# Patient Record
Sex: MALE | Race: BLACK OR AFRICAN AMERICAN | NOT HISPANIC OR LATINO | Employment: FULL TIME | URBAN - METROPOLITAN AREA
[De-identification: names, ages, dates, MRNs, and addresses within clinical notes are randomized per-mention and may not be internally consistent; named-entity substitution may affect disease eponyms.]

---

## 2017-01-12 ENCOUNTER — HISTORICAL (OUTPATIENT)
Dept: LAB | Facility: HOSPITAL | Age: 32
End: 2017-01-12

## 2017-01-13 ENCOUNTER — HISTORICAL (OUTPATIENT)
Dept: ANESTHESIOLOGY | Facility: HOSPITAL | Age: 32
End: 2017-01-13

## 2017-08-21 ENCOUNTER — HISTORICAL (OUTPATIENT)
Dept: LAB | Facility: HOSPITAL | Age: 32
End: 2017-08-21

## 2017-11-28 LAB
CHOLEST SERPL-MCNC: 181 MG/DL (ref 100–199)
HDLC SERPL-MCNC: 46 MG/DL
LDLC SERPL CALC-MCNC: 118 MG/DL (ref 0–99)
TRIGL SERPL-MCNC: 87 MG/DL (ref 0–149)
VLDLC SERPL CALC-MCNC: 17 MG/DL (ref 5–40)

## 2017-12-16 LAB
INFLUENZA A ANTIGEN, POC: POSITIVE
INFLUENZA B ANTIGEN, POC: NEGATIVE

## 2018-02-14 ENCOUNTER — HISTORICAL (OUTPATIENT)
Dept: LAB | Facility: HOSPITAL | Age: 33
End: 2018-02-14

## 2018-02-14 LAB — B-HCG FREE SERPL-ACNC: 1 MIU/ML

## 2018-02-23 ENCOUNTER — HISTORICAL (OUTPATIENT)
Dept: ANESTHESIOLOGY | Facility: HOSPITAL | Age: 33
End: 2018-02-23

## 2018-09-07 ENCOUNTER — HISTORICAL (OUTPATIENT)
Dept: LAB | Facility: HOSPITAL | Age: 33
End: 2018-09-07

## 2018-09-07 LAB — B-HCG FREE SERPL-ACNC: <1 MIU/ML

## 2018-09-17 ENCOUNTER — HISTORICAL (OUTPATIENT)
Dept: ANESTHESIOLOGY | Facility: HOSPITAL | Age: 33
End: 2018-09-17

## 2019-01-31 ENCOUNTER — HISTORICAL (OUTPATIENT)
Dept: LAB | Facility: HOSPITAL | Age: 34
End: 2019-01-31

## 2019-01-31 LAB
ALBUMIN SERPL-MCNC: 4 GM/DL (ref 3.4–5)
ALBUMIN/GLOB SERPL: 1.1 RATIO (ref 1.1–2)
ALP SERPL-CCNC: 123 UNIT/L (ref 46–116)
ALT SERPL-CCNC: 21 UNIT/L (ref 12–78)
APPEARANCE, UA: CLEAR
AST SERPL-CCNC: 17 UNIT/L (ref 15–37)
BACTERIA SPEC CULT: NORMAL
BILIRUB SERPL-MCNC: 0.3 MG/DL (ref 0.2–1)
BILIRUB UR QL STRIP: NEGATIVE
BILIRUBIN DIRECT+TOT PNL SERPL-MCNC: 0.07 MG/DL (ref 0–0.2)
BILIRUBIN DIRECT+TOT PNL SERPL-MCNC: 0.23 MG/DL (ref 0–0.8)
BUN SERPL-MCNC: 16.1 MG/DL (ref 7–18)
CALCIUM SERPL-MCNC: 9.2 MG/DL (ref 8.5–10.1)
CHLORIDE SERPL-SCNC: 99 MMOL/L (ref 98–107)
CHOLEST SERPL-MCNC: 202 MG/DL (ref 0–200)
CHOLEST/HDLC SERPL: 4.6 {RATIO} (ref 0–5)
CO2 SERPL-SCNC: 28.1 MMOL/L (ref 21–32)
COLOR UR: YELLOW
CREAT SERPL-MCNC: 1.37 MG/DL (ref 0.6–1.3)
GLOBULIN SER-MCNC: 3.8 GM/DL (ref 2.4–3.5)
GLUCOSE (UA): NEGATIVE
GLUCOSE SERPL-MCNC: 80 MG/DL (ref 74–106)
HDLC SERPL-MCNC: 44 MG/DL (ref 40–60)
HGB UR QL STRIP: NEGATIVE
KETONES UR QL STRIP: NEGATIVE
LDLC SERPL CALC-MCNC: 135 MG/DL (ref 0–129)
LEUKOCYTE ESTERASE UR QL STRIP: NEGATIVE
NITRITE UR QL STRIP: NEGATIVE
PH UR STRIP: 5.5 [PH] (ref 5–9)
POTASSIUM SERPL-SCNC: 4.4 MMOL/L (ref 3.5–5.1)
PROT SERPL-MCNC: 7.8 GM/DL (ref 6.4–8.2)
PROT UR QL STRIP: NEGATIVE
RBC #/AREA URNS HPF: NORMAL /[HPF]
SODIUM SERPL-SCNC: 135 MMOL/L (ref 136–145)
SP GR UR STRIP: 1.01 (ref 1–1.03)
SQUAMOUS EPITHELIAL, UA: NORMAL
TRIGL SERPL-MCNC: 114 MG/DL
UROBILINOGEN UR STRIP-ACNC: 0.2
VLDLC SERPL CALC-MCNC: 23 MG/DL
WBC #/AREA URNS HPF: NORMAL /HPF

## 2019-02-06 ENCOUNTER — HISTORICAL (OUTPATIENT)
Dept: ADMINISTRATIVE | Facility: HOSPITAL | Age: 34
End: 2019-02-06

## 2019-02-06 LAB
ALBUMIN SERPL-MCNC: 4.5 G/DL (ref 3.5–5.5)
ALBUMIN/GLOB SERPL: 1.6 {RATIO} (ref 1.2–2.2)
ALP SERPL-CCNC: 106 IU/L (ref 39–117)
ALT SERPL-CCNC: 16 IU/L (ref 0–44)
AST SERPL-CCNC: 17 IU/L (ref 0–40)
BILIRUB SERPL-MCNC: 0.3 MG/DL (ref 0–1.2)
BUN SERPL-MCNC: 15 MG/DL (ref 6–20)
CALCIUM SERPL-MCNC: 9.6 MG/DL (ref 8.7–10.2)
CHLORIDE SERPL-SCNC: 102 MMOL/L (ref 96–106)
CO2 SERPL-SCNC: 25 MMOL/L (ref 20–29)
CREAT SERPL-MCNC: 1.22 MG/DL (ref 0.76–1.27)
CREAT/UREA NIT SERPL: 12 (ref 9–20)
GLOBULIN SER-MCNC: 2.9 G/DL (ref 1.5–4.5)
GLUCOSE SERPL-MCNC: 84 MG/DL (ref 65–99)
POTASSIUM SERPL-SCNC: 4.4 MMOL/L (ref 3.5–5.2)
PROT SERPL-MCNC: 7.4 G/DL (ref 6–8.5)
SODIUM SERPL-SCNC: 141 MMOL/L (ref 134–144)

## 2019-02-26 ENCOUNTER — HISTORICAL (OUTPATIENT)
Dept: LAB | Facility: HOSPITAL | Age: 34
End: 2019-02-26

## 2019-02-26 LAB
BUN SERPL-MCNC: 17.5 MG/DL (ref 7–18)
CALCIUM SERPL-MCNC: 8.9 MG/DL (ref 8.5–10.1)
CHLORIDE SERPL-SCNC: 103 MMOL/L (ref 98–107)
CO2 SERPL-SCNC: 28.6 MMOL/L (ref 21–32)
CREAT SERPL-MCNC: 1.21 MG/DL (ref 0.6–1.3)
CREAT/UREA NIT SERPL: 14
GLUCOSE SERPL-MCNC: 93 MG/DL (ref 74–106)
POTASSIUM SERPL-SCNC: 4.5 MMOL/L (ref 3.5–5.1)
SODIUM SERPL-SCNC: 140 MMOL/L (ref 136–145)

## 2019-04-10 ENCOUNTER — HISTORICAL (OUTPATIENT)
Dept: LAB | Facility: HOSPITAL | Age: 34
End: 2019-04-10

## 2019-04-30 ENCOUNTER — HISTORICAL (OUTPATIENT)
Dept: ANESTHESIOLOGY | Facility: HOSPITAL | Age: 34
End: 2019-04-30

## 2019-11-30 ENCOUNTER — HISTORICAL (OUTPATIENT)
Dept: ADMINISTRATIVE | Facility: HOSPITAL | Age: 34
End: 2019-11-30

## 2020-03-03 ENCOUNTER — HISTORICAL (OUTPATIENT)
Dept: ADMINISTRATIVE | Facility: HOSPITAL | Age: 35
End: 2020-03-03

## 2020-03-03 LAB
ALBUMIN SERPL-MCNC: 4.6 G/DL (ref 4–5)
ALBUMIN/GLOB SERPL: 1.5 {RATIO} (ref 1.2–2.2)
ALP SERPL-CCNC: 118 IU/L (ref 39–117)
ALT SERPL-CCNC: 24 IU/L (ref 0–44)
AST SERPL-CCNC: 20 IU/L (ref 0–40)
BASOPHILS # BLD AUTO: 0.1 X10E3/UL (ref 0–0.2)
BASOPHILS NFR BLD AUTO: 1 %
BILIRUB SERPL-MCNC: 0.2 MG/DL (ref 0–1.2)
BUN SERPL-MCNC: 15 MG/DL (ref 6–20)
CALCIUM SERPL-MCNC: 9.4 MG/DL (ref 8.7–10.2)
CHLORIDE SERPL-SCNC: 99 MMOL/L (ref 96–106)
CHOLEST SERPL-MCNC: 222 MG/DL (ref 100–199)
CHOLEST/HDLC SERPL: 5.6 RATIO (ref 0–5)
CO2 SERPL-SCNC: 22 MMOL/L (ref 20–29)
CREAT SERPL-MCNC: 1.15 MG/DL (ref 0.76–1.27)
CREAT/UREA NIT SERPL: 13 (ref 9–20)
EOSINOPHIL # BLD AUTO: 0.1 X10E3/UL (ref 0–0.4)
EOSINOPHIL NFR BLD AUTO: 1 %
ERYTHROCYTE [DISTWIDTH] IN BLOOD BY AUTOMATED COUNT: 13.1 % (ref 11.6–15.4)
GLOBULIN SER-MCNC: 3 G/DL (ref 1.5–4.5)
GLUCOSE SERPL-MCNC: 84 MG/DL (ref 65–99)
HCT VFR BLD AUTO: 41.9 % (ref 37.5–51)
HDLC SERPL-MCNC: 40 MG/DL
HGB BLD-MCNC: 14 G/DL (ref 13–17.7)
LDLC SERPL CALC-MCNC: 125 MG/DL (ref 0–99)
LYMPHOCYTES # BLD AUTO: 1.6 X10E3/UL (ref 0.7–3.1)
LYMPHOCYTES NFR BLD AUTO: 20 %
MCH RBC QN AUTO: 28 PG (ref 26.6–33)
MCHC RBC AUTO-ENTMCNC: 33.4 G/DL (ref 31.5–35.7)
MCV RBC AUTO: 84 FL (ref 79–97)
MONOCYTES # BLD AUTO: 0.7 X10E3/UL (ref 0.1–0.9)
MONOCYTES NFR BLD AUTO: 8 %
NEUTROPHILS # BLD AUTO: 5.5 X10E3/UL (ref 1.4–7)
NEUTROPHILS NFR BLD AUTO: 70 %
PLATELET # BLD AUTO: 375 X10E3/UL (ref 150–450)
POTASSIUM SERPL-SCNC: 4.3 MMOL/L (ref 3.5–5.2)
PROT SERPL-MCNC: 7.6 G/DL (ref 6–8.5)
RBC # BLD AUTO: 5 X10(6)/MCL (ref 4.14–5.8)
SODIUM SERPL-SCNC: 140 MMOL/L (ref 134–144)
TRIGL SERPL-MCNC: 286 MG/DL (ref 0–149)
TSH SERPL-ACNC: 2.41 MIU/ML (ref 0.45–4.5)
VLDLC SERPL CALC-MCNC: 57 MG/DL (ref 5–40)
WBC # SPEC AUTO: 8 X10E3/UL (ref 3.4–10.8)

## 2021-02-08 ENCOUNTER — HISTORICAL (OUTPATIENT)
Dept: ADMINISTRATIVE | Facility: HOSPITAL | Age: 36
End: 2021-02-08

## 2021-02-08 LAB
AFP-TM SERPL-MCNC: 4.6 NG/ML
B-HCG FREE SERPL-ACNC: <2.42 MIU/ML

## 2021-02-19 ENCOUNTER — HISTORICAL (OUTPATIENT)
Dept: ANESTHESIOLOGY | Facility: HOSPITAL | Age: 36
End: 2021-02-19

## 2021-04-13 ENCOUNTER — HISTORICAL (OUTPATIENT)
Dept: ADMINISTRATIVE | Facility: HOSPITAL | Age: 36
End: 2021-04-13

## 2021-04-13 LAB
ABS NEUT (OLG): 8.53 X10(3)/MCL (ref 2.1–9.2)
ALBUMIN SERPL-MCNC: 3.8 GM/DL (ref 3.5–5)
ALBUMIN/GLOB SERPL: 1.1 RATIO (ref 1.1–2)
ALP SERPL-CCNC: 104 UNIT/L (ref 40–150)
ALT SERPL-CCNC: 20 UNIT/L (ref 0–55)
APPEARANCE, UA: CLEAR
AST SERPL-CCNC: 16 UNIT/L (ref 5–34)
BACTERIA SPEC CULT: NORMAL
BASOPHILS # BLD AUTO: 0 X10(3)/MCL (ref 0–0.2)
BASOPHILS NFR BLD AUTO: 0 %
BILIRUB SERPL-MCNC: 0.1 MG/DL
BILIRUB UR QL STRIP: NEGATIVE
BILIRUBIN DIRECT+TOT PNL SERPL-MCNC: 0 MG/DL (ref 0–0.8)
BILIRUBIN DIRECT+TOT PNL SERPL-MCNC: 0.1 MG/DL (ref 0–0.5)
BUN SERPL-MCNC: 18.3 MG/DL (ref 8.9–20.6)
CALCIUM SERPL-MCNC: 8.7 MG/DL (ref 8.4–10.2)
CHLORIDE SERPL-SCNC: 104 MMOL/L (ref 98–107)
CHOLEST SERPL-MCNC: 177 MG/DL
CHOLEST/HDLC SERPL: 4 {RATIO} (ref 0–5)
CO2 SERPL-SCNC: 26 MMOL/L (ref 22–29)
COLOR UR: YELLOW
CREAT SERPL-MCNC: 1.14 MG/DL (ref 0.73–1.18)
CRP SERPL-MCNC: 2 MG/DL (ref 0–1)
EOSINOPHIL # BLD AUTO: 0.1 X10(3)/MCL (ref 0–0.9)
EOSINOPHIL NFR BLD AUTO: 1 %
ERYTHROCYTE [DISTWIDTH] IN BLOOD BY AUTOMATED COUNT: 13.4 % (ref 11.5–17)
ERYTHROCYTE [SEDIMENTATION RATE] IN BLOOD: 38 MM/HR (ref 0–15)
GLOBULIN SER-MCNC: 3.4 GM/DL (ref 2.4–3.5)
GLUCOSE (UA): NEGATIVE
GLUCOSE SERPL-MCNC: 86 MG/DL (ref 74–100)
HCT VFR BLD AUTO: 36.5 % (ref 42–52)
HDLC SERPL-MCNC: 42 MG/DL (ref 35–60)
HGB BLD-MCNC: 11.6 GM/DL (ref 14–18)
HGB UR QL STRIP: NEGATIVE
IMM GRANULOCYTES # BLD AUTO: 0.02 % (ref 0–0.02)
IMM GRANULOCYTES NFR BLD AUTO: 0.2 % (ref 0–0.43)
KETONES UR QL STRIP: NEGATIVE
LDLC SERPL CALC-MCNC: 119 MG/DL (ref 50–140)
LEUKOCYTE ESTERASE UR QL STRIP: NEGATIVE
LYMPHOCYTES # BLD AUTO: 1.8 X10(3)/MCL (ref 0.6–4.6)
LYMPHOCYTES NFR BLD AUTO: 16 %
MCH RBC QN AUTO: 26.4 PG (ref 27–31)
MCHC RBC AUTO-ENTMCNC: 31.8 GM/DL (ref 33–36)
MCV RBC AUTO: 83.1 FL (ref 80–94)
MONOCYTES # BLD AUTO: 0.7 X10(3)/MCL (ref 0.1–1.3)
MONOCYTES NFR BLD AUTO: 6 %
NEUTROPHILS # BLD AUTO: 8.53 X10(3)/MCL (ref 1.4–7.9)
NEUTROPHILS NFR BLD AUTO: 76 %
NITRITE UR QL STRIP: NEGATIVE
PH UR STRIP: 5.5 [PH] (ref 5–9)
PLATELET # BLD AUTO: 380 X10(3)/MCL (ref 130–400)
PMV BLD AUTO: 10.3 FL (ref 9.4–12.4)
POTASSIUM SERPL-SCNC: 4.2 MMOL/L (ref 3.5–5.1)
PROT SERPL-MCNC: 7.2 GM/DL (ref 6.4–8.3)
PROT UR QL STRIP: NEGATIVE
RBC # BLD AUTO: 4.39 X10(6)/MCL (ref 4.7–6.1)
RBC #/AREA URNS HPF: NORMAL /[HPF]
SODIUM SERPL-SCNC: 139 MMOL/L (ref 136–145)
SP GR UR STRIP: >=1.03 (ref 1–1.03)
SQUAMOUS EPITHELIAL, UA: NORMAL
TRIGL SERPL-MCNC: 78 MG/DL (ref 34–140)
UROBILINOGEN UR STRIP-ACNC: 0.2
VLDLC SERPL CALC-MCNC: 16 MG/DL
WBC # SPEC AUTO: 11.2 X10(3)/MCL (ref 4.5–11.5)
WBC #/AREA URNS HPF: NORMAL /[HPF]

## 2021-06-25 ENCOUNTER — HISTORICAL (OUTPATIENT)
Dept: RADIOLOGY | Facility: HOSPITAL | Age: 36
End: 2021-06-25

## 2022-03-14 ENCOUNTER — HISTORICAL (OUTPATIENT)
Dept: ADMINISTRATIVE | Facility: HOSPITAL | Age: 37
End: 2022-03-14

## 2022-03-14 LAB
AFP-TM SERPL-MCNC: 4.8 NG/ML
B-HCG FREE SERPL-ACNC: <2.42 [IU]/L

## 2022-04-10 ENCOUNTER — HISTORICAL (OUTPATIENT)
Dept: ADMINISTRATIVE | Facility: HOSPITAL | Age: 37
End: 2022-04-10
Payer: COMMERCIAL

## 2022-04-26 VITALS
SYSTOLIC BLOOD PRESSURE: 127 MMHG | WEIGHT: 194.38 LBS | BODY MASS INDEX: 28.79 KG/M2 | DIASTOLIC BLOOD PRESSURE: 85 MMHG | HEIGHT: 69 IN | OXYGEN SATURATION: 98 %

## 2022-04-30 NOTE — PROGRESS NOTES
Patient:   Robb Carballo            MRN: [id number]            FIN: [id number]               Age:   35 years     Sex:  Male     :  1985   Associated Diagnoses:   Wellness examination; HTN (hypertension); Mixed hyperlipidemia; H/O testicular cancer   Author:   Renee Baez MD      Visit Information   Visit type:  Annual exam.    Accompanied by:  No one.    History limitation:  None.       Chief Complaint   3/3/2020 16:10 CST       CHECK UP        Well Adult History   Well Adult History             The patient presents for well adult exam.  The patient's general health status is described as good.  The patient's diet is described as balanced.  Exercise: Starting to try again to do basketball. His hip and knee had limited him lately.  Medical encounters: [facility] for the hip pain. .  Compliance problems: none.  Complaint: swelling in legs at night, also notes swelling when knee is hurting.l  , has lasted for 3 month(s), On feet at work all day, when he gets home noting he has dent where socks ride. , is mild and is episodic.        Review of Systems   Constitutional:  Weight gain, he thinks the steroids made it happen.    Eye:  Negative.    Ear/Nose/Mouth/Throat:  Negative.    Respiratory:  no orthopnea, No shortness of breath.    Cardiovascular:  Peripheral edema, No palpitations, No tachycardia.    Gastrointestinal:       Abdominal pain: Right, Middle, two days ago, changed with positon/ deep breath, gone after a day, no change in bowels.    Genitourinary:  Due summer with urology.    Hematology/Lymphatics:  Negative.    Endocrine:  Negative.    Musculoskeletal:  Joint pain, Sleeps on belly, if he positions for knee comfort his hip hurts, if he positions for hip the knee hurts. He gets sciatic pain at times. They gave him steroid, Tramadol and Nabumetone at [facility], it helped but the steroids made him eat everything and he was hyped up with them so he stopped them. , When the right leg is flared he'll find  fluid in the upper calf.    Integumentary:  Negative.    Neurologic:  Negative.    Psychiatric:  Negative.       Health Status   Allergies:    Allergic Reactions (Selected)  No Known Allergies   Current medications:  (Selected)   Prescriptions  Prescribed  amLODIPine 10 mg oral tablet: 10 mg = 1 tab(s), Oral, Daily, # 90 tab(s), 1 Refill(s), Pharmacy: [facility]  amlodipine 10 mg oral tablet: See Instructions, TAKE 1 TABLET BY MOUTH ONCE DAILY, # 90 tab(s), 1 Refill(s), Pharmacy: [facility]  losartan 100 mg oral tablet: See Instructions, TAKE 1 TABLET BY MOUTH ONCE DAILY, # 90 tab(s), 1 Refill(s), eRx: [facility]  nabumetone 500 mg oral tablet: 500 mg = 1 tab(s), Oral, BID, Take with food, # 20 tab(s), 0 Refill(s), Pharmacy: [facility]   Problem list:    All Problems  Alkaline phosphatase elevation / [id number] / Confirmed  HTN - Hypertension / [id number] / Confirmed  History of testicular cancer(  Confirmed  ) / [id number] / Confirmed  Mixed hyperlipidemia / [id number] / Confirmed  Obesity / [id number] / Probable  Small kidney(  Confirmed  ) / [id number] / Confirmed      Histories   Past Medical History:    Resolved  Testicular cancer ([id number]):  Resolved.   Family History:    Cancer  Grandfather (n)  Comments:  6/18/2015 11:23 DARENT - Lor Rivera.  paternal grandfather- pancreatic cncer  maternalo grandfather- stomach  Heart failure.  Grandfather (n)  Grandmother  Hypertension.  Mother  Grandfather (n)  Diabetes mellitus type 1.  Grandfather (n)  Grandmother     Procedure history:    ACL tear. (SNOMED CT [id number]).  Retroperitoneal mass (SNOMED CT 215262980).  Orchidectomy (SNOMED CT 7124273438).   Social History        Social & Psychosocial Habits    Alcohol  06/18/2015  Use: Current    Type: Beer    Frequency: 1-2 times per week    Employment/School  06/18/2015  Status: Employed     Description: [employer]    Home/Environment  06/18/2015  Lives with: Spouse    Substance Use  06/18/2015 Risk Assessment: Denies Substance Abuse    03/03/2020  Use: Never    Tobacco  06/18/2015 Risk Assessment: Denies Tobacco Use    03/03/2020  Use: Never (less than 100 in l    Patient Wants Consult For Cessation Counseling No    Concerns about tobacco use in household: No    Smokeless tobacco use: Never    Abuse/Neglect  03/03/2020  SHX Any signs of abuse or neglect No    Feels unsafe at home: No    Safe place to go: Yes  .        Physical Examination   Vital Signs   3/3/2020 16:10 CST       Temperature Oral          36.5 DegC                             Temperature Oral (calculated)             97.70 DegF                             Peripheral Pulse Rate     82 bpm                             Respiratory Rate          16 br/min                             Systolic Blood Pressure   131 mmHg                             Diastolic Blood Pressure  83 mmHg                             Blood Pressure Location   Left arm                             Manual Cuff BP            No     Measurements from flowsheet : Measurements   3/3/2020 16:10 CST       Weight Dosing             97.52 kg                             Weight Measured           97.52 kg                             Weight Measured and Calculated in Lbs     214.99 lb                             Height/Length Dosing      175 cm                             Height/Length Measured    175 cm                             BSA Measured              2.18 m2                             Body Mass Index Measured  31.84 kg/m2     General:  Alert and oriented.    Eye:  Extraocular movements are intact, Normal conjunctiva.    HENT:  Tympanic membranes are clear, Oral mucosa is moist, No pharyngeal erythema.    Neck:  Supple, No carotid bruit, No jugular venous distention, No lymphadenopathy, No thyromegaly.    Respiratory:  Lungs are clear to auscultation, Respirations are  non-labored.    Cardiovascular:  Normal rate, Regular rhythm, No gallop, No edema today.    Gastrointestinal:  Soft, Non-tender, Normal bowel sounds, midline scar but pain was lateral. No herniation at scar..    Genitourinary:  No costovertebral angle tenderness, No scrotal tenderness.    Musculoskeletal:  Normal gait, slight swelling over the right knee, ROM is good. .    Integumentary:  Warm, Dry, No rash.    Neurologic:  Alert, Oriented, No focal deficits.    Psychiatric:  Cooperative, Appropriate mood & affect.       Health Maintenance      Review / Management   Differential diagnosis:  Wellness exam recent development of hip/sciatic pain, will refill Relafen but need to watch with hx of small kidney. Annual lab done today  HTN stable  Hyperlipidemia lab done today nonfasting.  Hx testicular cancer keeping follow up [facility] urology.    Results review:  Lab results   2/26/2019 16:45 CST      Creatinine                1.21 mg/dL    2/6/2019 3:00 CST        Creatinine                1.22 mg/dL    1/31/2019 18:24 CST      UA Appear                 Clear                             UA Color                  Yellow                             UA Spec Grav              1.015                             UA Bili                   Negative                             UA pH                     5.5                             UA Urobilinogen           0.2                             UA Blood                  Negative                             UA Glucose                Negative                             UA Ketones                Negative                             UA Protein                Negative                             UA Nitrite                Negative                             UA Leuk Est               Negative                             UA WBC                    0-2 /HPF                             UA RBC                    None Seen                             UA Bacteria               None Seen                              UA Squam Epithelial       None Seen                             Sodium Lvl                135 mmol/L  LOW                             Potassium Lvl             4.4 mmol/L                             Chloride                  99 mmol/L                             CO2                       28.1 mmol/L                             Calcium Lvl               9.2 mg/dL                             Glucose Lvl               80 mg/dL                             BUN                       16.1 mg/dL                             Creatinine                1.37 mg/dL  HI                             eGFR-AA                   >60 mL/min/1.73 m2  NA                             eGFR-REY                  >60 mL/min/1.73 m2  NA                             Bili Total                0.3 mg/dL                             Bili Direct               0.07 mg/dL                             Bili Indirect             0.23 mg/dL                             AST                       17 unit/L                             ALT                       21 unit/L                             Alk Phos                  123 unit/L  HI                             Total Protein             7.8 gm/dL                             Albumin Lvl               4.00 gm/dL                             Globulin                  3.80 gm/dL  HI                             A/G Ratio                 1.1 ratio                             Chol                      202 mg/dL  HI                             HDL                       44 mg/dL                             Trig                      114 mg/dL                             LDL                       135 mg/dL  HI                             Chol/HDL                  4.6                             VLDL                      23 mg/dL  NA  .       Impression and Plan   Diagnosis     Wellness examination (VRI57-LM Z00.00).     H/O testicular cancer (WJZ52-QC Z85.47).     HTN (hypertension) (BHF40-FR I10).     Mixed  hyperlipidemia (VJL18-ZK E78.2).     Patient Instructions:  BMI for Adults.         Counseled: Patient, Regarding diagnosis, Regarding medications, Verbalized understanding, no barrier to care.    Orders     Orders   Pharmacy:  nabumetone 500 mg oral tablet (Prescribe): 500 mg = 1 tab(s), Oral, BID, Take with food PRN joint pain, X 21 day(s), # 42 tab(s), 1 Refill(s), Pharmacy: [facility], 175, cm, Height/Length Dosing, 3/3/2020 16:10 CST, 97.52, kg, Weight Dosing, 3/3/2020 16:10 CST  losartan 100 mg oral tablet (Prescribe): See Instructions, TAKE 1 TABLET BY MOUTH ONCE DAILY, # 90 tab(s), 1 Refill(s), Pharmacy: [facility], 175, cm, Height/Length Dosing, 3/3/2020 16:10 CST, 97.52, kg, Weight Dosing, 3/3/2020 16:10 CST  amLODIPine 10 mg oral tablet (Prescribe): 10 mg = 1 tab(s), Oral, Daily, # 90 tab(s), 1 Refill(s), Pharmacy: [facility], 175, cm, Height/Length Dosing, 3/3/2020 16:10 CST, 97.52, kg, Weight Dosing, 3/3/2020 16:10 CST  nabumetone 500 mg oral tablet (Discontinue): 3/3/2020 16:45 CST  amLODIPine 10 mg oral tablet (Discontinue): 3/3/2020 16:44 CST  amlodipine 10 mg oral tablet (Discontinue): 3/3/2020 16:44 CST  losartan 100 mg oral tablet (Discontinue)  Evaluation and Management:  74[address] Preventative Health Care Est 40-64 years [id number] PC (Order): Wellness examination  HTN (hypertension)  H/O testicular cancer  Mixed hyperlipidemia  Right hip pain  Edema  Knee pain  Small kidney, [facility], 3/3/2020 16:46 CST  Ambulatory Referrals:  Clinic Follow up (Order): *Est. 9/3/2020 3:00 CDT, Order for future visit, HTN (hypertension)  H/O testicular cancer  Mixed hyperlipidemia  Right hip pain, Renee Baez MD.

## 2022-05-02 NOTE — HISTORICAL [facility] CERNER
This is a historical note converted from Mikel. Formatting and pictures may have been removed.  Please reference Mikel for original formatting and attached multimedia. Chief Complaint  right sided hip pain when walking send numbing pain down leg x4 days  History of Present Illness  Patient complains of right-sided hip pain. ?He states he has had some sciatica type numbness shooting down the posterior lateral leg for the past few months.? He states that he presents today with?a new type of hip pain which is more?in the anterior hip?and occasionally over the greater trochanter area.? States the pain worsens with ambulation and with hip movements.? He denies any erythema warmth or skin rashes.  ?  Review of Systems  Constitutional_no fever, fatigue, weakness  Musculoskeletal_[ ]  Integumentary_no skin rash or abnormal lesion  Neurologic_no headache, no dizziness, no weakness or numbness  ?  Physical Exam  Vitals & Measurements  T:?36.7? ?C (Oral)? HR:?66(Peripheral)? RR:?18? BP:?133/86? SpO2:?100%?  HT:?175?cm? WT:?91.1?kg? BMI:?29.75?  General_well-developed well-nourished in no acute distress  Musculoskeletal_[no lumbar tenderness. ?Mild greater trochanteric tenderness.? Full range of motion of the hip but complains of pain?with hip flexion and external rotation?mostly?consisting of pain to the anterior hip.]  Integumentary_no rashes or skin lesions present  Neurologic_? no signs of peripheral neurological deficit, motor/sensory function intact  ?  Assessment/Plan  1.?Right hip pain?M25.551  ?Rest. ?Ice.? Take meds with food. ?Sedation warning with pain medication.? Follow-up with orthopedics if symptoms persist.  Ordered:  nabumetone, 500 mg = 1 tab(s), Oral, BID, Take with food, # 20 tab(s), 0 Refill(s), Pharmacy: [facility]  predniSONE, 20 mg = 1 tab(s), Oral, BID, X 5 day(s), # 10 tab(s), 0 Refill(s), Pharmacy: [facility]  traMADol, 50 mg = 1 tab(s), Oral, q6hr, PRN PRN for pain, X 5 day(s),  # 20 tab(s), 0 Refill(s), Pharmacy: [facility]  Office/Outpatient Visit Level 3 Established [id number] PC, Right hip pain, [facility], 11/30/19 14:11:00 CST  XR Hip Right 2 Views, Routine, 11/30/19 14:11:00 CST, Pain, None, Ambulatory, Rad Type, Right hip pain, Not Scheduled, 11/30/19 14:11:00 CST  ?   Problem List/Past Medical History  Ongoing  Alkaline phosphatase elevation  History of testicular cancer(  Confirmed  )  HTN - Hypertension  Mixed hyperlipidemia  Obesity  Small kidney(  Confirmed  )  Historical  Testicular cancer  Procedure/Surgical History  ACL tear.  Orchidectomy  Retroperitoneal mass   Medications  amlodipine 10 mg oral tablet, See Instructions, 1 refills  losartan 100 mg oral tablet, See Instructions, 1 refills  nabumetone 500 mg oral tablet, 500 mg= 1 tab(s), Oral, BID  prednisONE 20 mg oral tablet, 20 mg= 1 tab(s), Oral, BID  Ultram 50 mg oral tablet, 50 mg= 1 tab(s), Oral, q6hr, PRN  Allergies  No Known Allergies  Social History  Abuse/Neglect  No, 11/30/2019  No, No, Yes, 08/22/2019  Alcohol  Current, Beer, 1-2 times per week, 06/18/2015  Employment/School  Employed, Work/School description: [employer] 06/18/2015  Home/Environment  Lives with Spouse., 06/18/2015  Substance Use - Denies Substance Abuse, 06/18/2015  Never, 07/12/2016  Tobacco - Denies Tobacco Use, 06/18/2015  Never (less than 100 in lifetime), N/A, 11/30/2019  Family History  Cancer: Grandfather.  Diabetes mellitus type 1.: Grandfather and Grandmother.  Heart failure.: Grandfather and Grandmother.  Hypertension.: Mother and Grandfather.  Health Maintenance  Health Maintenance  ???Pending?(in the next year)  ??? ??OverDue  ??? ? ? ?Diabetes Screening due??and every?  ??? ??Due?  ??? ? ? ?Hypertension Management-Education due??11/30/19??and every 1??year(s)  ??? ? ? ?Influenza Vaccine due??11/30/19??and every?  ??? ? ? ?Tetanus Vaccine due??11/30/19??and every 10??year(s)  ??? ??Due In Future?  ??? ? ? ?Alcohol  Misuse Screening not due until??01/01/20??and every 1??year(s)  ??? ? ? ?Obesity Screening not due until??01/01/20??and every 1??year(s)  ??? ? ? ?Depression Screening not due until??01/31/20??and every 1??year(s)  ??? ? ? ?Hypertension Management-BMP not due until??02/26/20??and every 1??year(s)  ??? ? ? ?ADL Screening not due until??08/22/20??and every 1??year(s)  ??? ? ? ?Blood Pressure Screening not due until??11/29/20??and every 1??year(s)  ??? ? ? ?Body Mass Index Check not due until??11/29/20??and every 1??year(s)  ??? ? ? ?Hypertension Management-Blood Pressure not due until??11/29/20??and every 1??year(s)  ???Satisfied?(in the past 1 year)  ??? ??Satisfied?  ??? ? ? ?ADL Screening on??08/22/19.??Satisfied by Renee Baez MD  ??? ? ? ?Alcohol Misuse Screening on??08/22/19.??Satisfied by Belen Hernandez LPN  ??? ? ? ?Blood Pressure Screening on??11/30/19.??Satisfied by Bhargavi Gonzales  ??? ? ? ?Body Mass Index Check on??11/30/19.??Satisfied by Bhargavi Gonzales  ??? ? ? ?Depression Screening on??01/31/19.??Satisfied by Belen Hernandez LPN  ??? ? ? ?Diabetes Screening on??02/26/19.??Satisfied by Rod Perez  ??? ? ? ?Hypertension Management-Blood Pressure on??11/30/19.??Satisfied by Bhargavi Gonzales  ??? ? ? ?Influenza Vaccine on??11/30/19.??Satisfied by Bhargavi Gonzales  ??? ? ? ?Lipid Screening on??01/31/19.??Satisfied by Eriberto Gannon  ??? ? ? ?Obesity Screening on??11/30/19.??Satisfied by Bhargavi Gonzales  ?  Diagnostic Results  xrays- mild degenerative changes. no acute findings observed

## 2022-05-16 RX ORDER — AMLODIPINE BESYLATE 10 MG/1
10 TABLET ORAL DAILY
COMMUNITY
Start: 2022-02-16 | End: 2022-09-07

## 2022-05-16 RX ORDER — LOSARTAN POTASSIUM 100 MG/1
100 TABLET ORAL DAILY
COMMUNITY
Start: 2021-07-08 | End: 2022-05-17 | Stop reason: DRUGHIGH

## 2022-05-17 ENCOUNTER — OFFICE VISIT (OUTPATIENT)
Dept: PRIMARY CARE CLINIC | Facility: CLINIC | Age: 37
End: 2022-05-17
Payer: COMMERCIAL

## 2022-05-17 VITALS
RESPIRATION RATE: 16 BRPM | SYSTOLIC BLOOD PRESSURE: 128 MMHG | TEMPERATURE: 98 F | HEIGHT: 69 IN | BODY MASS INDEX: 28.91 KG/M2 | HEART RATE: 79 BPM | WEIGHT: 195.19 LBS | OXYGEN SATURATION: 98 % | DIASTOLIC BLOOD PRESSURE: 79 MMHG

## 2022-05-17 DIAGNOSIS — E78.2 MIXED HYPERLIPIDEMIA: ICD-10-CM

## 2022-05-17 DIAGNOSIS — I10 PRIMARY HYPERTENSION: ICD-10-CM

## 2022-05-17 DIAGNOSIS — C62.90 MALIGNANT NEOPLASM OF TESTICLE, UNSPECIFIED LATERALITY, UNSPECIFIED WHETHER DESCENDED OR UNDESCENDED: ICD-10-CM

## 2022-05-17 DIAGNOSIS — Z00.00 WELLNESS EXAMINATION: Primary | ICD-10-CM

## 2022-05-17 DIAGNOSIS — G89.29 CHRONIC PAIN OF LEFT ANKLE: ICD-10-CM

## 2022-05-17 DIAGNOSIS — M25.572 CHRONIC PAIN OF LEFT ANKLE: ICD-10-CM

## 2022-05-17 PROBLEM — N27.9 SMALL KIDNEY: Status: ACTIVE | Noted: 2022-05-17

## 2022-05-17 PROBLEM — R74.8 HIGH ALKALINE PHOSPHATASE: Status: ACTIVE | Noted: 2022-05-17

## 2022-05-17 LAB
ALBUMIN SERPL-MCNC: 3.7 GM/DL (ref 3.5–5)
ALBUMIN/GLOB SERPL: 0.9 RATIO (ref 1.1–2)
ALP SERPL-CCNC: 115 UNIT/L (ref 40–150)
ALT SERPL-CCNC: 14 UNIT/L (ref 0–55)
APPEARANCE UR: CLEAR
AST SERPL-CCNC: 15 UNIT/L (ref 5–34)
BACTERIA #/AREA URNS AUTO: NORMAL /HPF
BASOPHILS # BLD AUTO: 0.04 X10(3)/MCL (ref 0–0.2)
BASOPHILS NFR BLD AUTO: 0.4 %
BILIRUB UR QL STRIP.AUTO: NEGATIVE MG/DL
BILIRUBIN DIRECT+TOT PNL SERPL-MCNC: 0.3 MG/DL
BUN SERPL-MCNC: 16.9 MG/DL (ref 8.9–20.6)
CALCIUM SERPL-MCNC: 9.3 MG/DL (ref 8.4–10.2)
CHLORIDE SERPL-SCNC: 102 MMOL/L (ref 98–107)
CHOLEST SERPL-MCNC: 193 MG/DL
CHOLEST/HDLC SERPL: 5 {RATIO} (ref 0–5)
CO2 SERPL-SCNC: 25 MMOL/L (ref 22–29)
COLOR UR AUTO: YELLOW
CREAT SERPL-MCNC: 1.24 MG/DL (ref 0.73–1.18)
EOSINOPHIL # BLD AUTO: 0.06 X10(3)/MCL (ref 0–0.9)
EOSINOPHIL NFR BLD AUTO: 0.6 %
ERYTHROCYTE [DISTWIDTH] IN BLOOD BY AUTOMATED COUNT: 13.2 % (ref 11.5–17)
EST. AVERAGE GLUCOSE BLD GHB EST-MCNC: 105.4 MG/DL
GLOBULIN SER-MCNC: 3.9 GM/DL (ref 2.4–3.5)
GLUCOSE SERPL-MCNC: 89 MG/DL (ref 74–100)
GLUCOSE UR QL STRIP.AUTO: NEGATIVE MG/DL
HBA1C MFR BLD: 5.3 %
HCT VFR BLD AUTO: 35.4 % (ref 42–52)
HDLC SERPL-MCNC: 41 MG/DL (ref 35–60)
HGB BLD-MCNC: 11.8 GM/DL (ref 14–18)
IMM GRANULOCYTES # BLD AUTO: 0.02 X10(3)/MCL (ref 0–0.02)
IMM GRANULOCYTES NFR BLD AUTO: 0.2 % (ref 0–0.43)
KETONES UR QL STRIP.AUTO: NEGATIVE MG/DL
LDLC SERPL CALC-MCNC: 130 MG/DL (ref 50–140)
LEUKOCYTE ESTERASE UR QL STRIP.AUTO: NEGATIVE UNIT/L
LYMPHOCYTES # BLD AUTO: 1.74 X10(3)/MCL (ref 0.6–4.6)
LYMPHOCYTES NFR BLD AUTO: 16.3 %
MCH RBC QN AUTO: 26.6 PG (ref 27–31)
MCHC RBC AUTO-ENTMCNC: 33.3 MG/DL (ref 33–36)
MCV RBC AUTO: 79.9 FL (ref 80–94)
MONOCYTES # BLD AUTO: 0.59 X10(3)/MCL (ref 0.1–1.3)
MONOCYTES NFR BLD AUTO: 5.5 %
NEUTROPHILS # BLD AUTO: 8.2 X10(3)/MCL (ref 2.1–9.2)
NEUTROPHILS NFR BLD AUTO: 77 %
NITRITE UR QL STRIP.AUTO: NEGATIVE
PH UR STRIP.AUTO: 6 [PH]
PLATELET # BLD AUTO: 432 X10(3)/MCL (ref 130–400)
PMV BLD AUTO: 9.8 FL (ref 9.4–12.4)
POTASSIUM SERPL-SCNC: 4.3 MMOL/L (ref 3.5–5.1)
PROT SERPL-MCNC: 7.6 GM/DL (ref 6.4–8.3)
PROT UR QL STRIP.AUTO: NEGATIVE MG/DL
RBC # BLD AUTO: 4.43 X10(6)/MCL (ref 4.7–6.1)
RBC #/AREA URNS AUTO: NORMAL /HPF
RBC UR QL AUTO: NEGATIVE UNIT/L
SODIUM SERPL-SCNC: 139 MMOL/L (ref 136–145)
SP GR UR STRIP.AUTO: 1.02
SQUAMOUS #/AREA URNS AUTO: NORMAL /LPF
TRIGL SERPL-MCNC: 110 MG/DL (ref 34–140)
TSH SERPL-ACNC: 1.44 UIU/ML (ref 0.35–4.94)
UROBILINOGEN UR STRIP-ACNC: 0.2 MG/DL
VLDLC SERPL CALC-MCNC: 22 MG/DL
WBC # SPEC AUTO: 10.7 X10(3)/MCL (ref 4.5–11.5)
WBC #/AREA URNS AUTO: NORMAL /HPF

## 2022-05-17 PROCEDURE — 3074F PR MOST RECENT SYSTOLIC BLOOD PRESSURE < 130 MM HG: ICD-10-PCS | Mod: CPTII,,, | Performed by: INTERNAL MEDICINE

## 2022-05-17 PROCEDURE — 81001 URINALYSIS AUTO W/SCOPE: CPT | Performed by: INTERNAL MEDICINE

## 2022-05-17 PROCEDURE — 4010F ACE/ARB THERAPY RXD/TAKEN: CPT | Mod: CPTII,,, | Performed by: INTERNAL MEDICINE

## 2022-05-17 PROCEDURE — 1159F MED LIST DOCD IN RCRD: CPT | Mod: CPTII,,, | Performed by: INTERNAL MEDICINE

## 2022-05-17 PROCEDURE — 1160F RVW MEDS BY RX/DR IN RCRD: CPT | Mod: CPTII,,, | Performed by: INTERNAL MEDICINE

## 2022-05-17 PROCEDURE — 85025 COMPLETE CBC W/AUTO DIFF WBC: CPT | Performed by: INTERNAL MEDICINE

## 2022-05-17 PROCEDURE — 1160F PR REVIEW ALL MEDS BY PRESCRIBER/CLIN PHARMACIST DOCUMENTED: ICD-10-PCS | Mod: CPTII,,, | Performed by: INTERNAL MEDICINE

## 2022-05-17 PROCEDURE — 3078F PR MOST RECENT DIASTOLIC BLOOD PRESSURE < 80 MM HG: ICD-10-PCS | Mod: CPTII,,, | Performed by: INTERNAL MEDICINE

## 2022-05-17 PROCEDURE — 80053 COMPREHEN METABOLIC PANEL: CPT | Performed by: INTERNAL MEDICINE

## 2022-05-17 PROCEDURE — 83036 HEMOGLOBIN GLYCOSYLATED A1C: CPT | Performed by: INTERNAL MEDICINE

## 2022-05-17 PROCEDURE — 3008F BODY MASS INDEX DOCD: CPT | Mod: CPTII,,, | Performed by: INTERNAL MEDICINE

## 2022-05-17 PROCEDURE — 80061 LIPID PANEL: CPT | Performed by: INTERNAL MEDICINE

## 2022-05-17 PROCEDURE — 99395 PREV VISIT EST AGE 18-39: CPT | Mod: ,,, | Performed by: INTERNAL MEDICINE

## 2022-05-17 PROCEDURE — 4010F PR ACE/ARB THEARPY RXD/TAKEN: ICD-10-PCS | Mod: CPTII,,, | Performed by: INTERNAL MEDICINE

## 2022-05-17 PROCEDURE — 3074F SYST BP LT 130 MM HG: CPT | Mod: CPTII,,, | Performed by: INTERNAL MEDICINE

## 2022-05-17 PROCEDURE — 3008F PR BODY MASS INDEX (BMI) DOCUMENTED: ICD-10-PCS | Mod: CPTII,,, | Performed by: INTERNAL MEDICINE

## 2022-05-17 PROCEDURE — 84443 ASSAY THYROID STIM HORMONE: CPT | Performed by: INTERNAL MEDICINE

## 2022-05-17 PROCEDURE — 3078F DIAST BP <80 MM HG: CPT | Mod: CPTII,,, | Performed by: INTERNAL MEDICINE

## 2022-05-17 PROCEDURE — 36415 COLL VENOUS BLD VENIPUNCTURE: CPT | Performed by: INTERNAL MEDICINE

## 2022-05-17 PROCEDURE — 99395 PR PREVENTIVE VISIT,EST,18-39: ICD-10-PCS | Mod: ,,, | Performed by: INTERNAL MEDICINE

## 2022-05-17 PROCEDURE — 1159F PR MEDICATION LIST DOCUMENTED IN MEDICAL RECORD: ICD-10-PCS | Mod: CPTII,,, | Performed by: INTERNAL MEDICINE

## 2022-05-17 NOTE — PROGRESS NOTES
Renee Baez MD   [address] [place] [address]     PATIENT NAME: Robb Carballo  : 1985  DATE: 22  MRN: [id number]      Billing Provider: Renee Baez MD  Level of Service: MT PREVENTIVE VISIT,EST,18-39  Patient PCP Information     Provider PCP Type    Renee Baez MD General          Reason for Visit / Chief Complaint: Annual Exam and Foot Pain (Left foot pain)       Update PCP  Update Chief Complaint         History of Present Illness / Problem Focused Workflow     Robb Carballo presents to the clinic with Annual Exam and Foot Pain (Left foot pain)     37 year old AAM seen for annual exam. He was having some issues with swelling but he is doing better. He is due a renal scan with his urologist, Dr Wolfe. He has not seen Dr Álvarez lately but his knee is giving him more trouble, at night he can barely turn over with it. He is also having problems with his left ankle. He did fall and then again stepped wrong at work and had it swollen but did not go check it. It keeps hurting and swelling although today it is a little better. He was red over the medial malleolar region when he first started having the issue.   He is compliant with his medications, he requested a refill on Losartan today.  He is trying to stay active but the joint issues are limiting him some.   He did eat today but deer and rabbit for the meat. It is hard for him to get in to do lab later.       Review of Systems     Review of Systems   Constitutional: Negative.    HENT: Negative.    Eyes: Negative.    Respiratory: Negative.    Cardiovascular: Positive for leg swelling.        Left at ankle   Gastrointestinal: Negative.    Genitourinary: Negative for difficulty urinating, flank pain and hematuria.   Musculoskeletal: Positive for arthralgias and gait problem.        Hard to walk on left ankle at times.    Skin:        The red area on ankle is now darker   Allergic/Immunologic: Negative.    Neurological: Negative for dizziness,  syncope, weakness and headaches.   Hematological: Negative.    Psychiatric/Behavioral: Negative.        Medical / Social / Family History     Past Medical History:   Diagnosis Date    Alkaline phosphatase elevation     Colitis     COVID-19     Helicobacter pylori gastrointestinal tract infection 06/04/2021    HTN (hypertension)     Hx of testicular cancer     Mixed hyperlipidemia     Obesity, unspecified     Small kidney        Past Surgical History:   Procedure Laterality Date    ANTERIOR CRUCIATE LIGAMENT REPAIR N/A     COLONOSCOPY W/ BIOPSIES AND POLYPECTOMY      ESOPHAGOGASTRODUODENOSCOPY N/A 06/04/2021    with biopsy    ORCHIECTOMY      RETROPERITONEAL MASS EXCISION         Social History  Mr. Carballo      reports that he has never smoked. He has never used smokeless tobacco.    Family History  Mr.'s Carballo   family history includes Coronary artery disease in his maternal grandfather; Diabetes in his maternal grandfather and maternal grandmother; Heart failure in his maternal grandfather, maternal grandmother, and paternal grandmother; Hypertension in his mother; Pancreatic cancer in his paternal grandmother; Stomach cancer in his maternal grandfather; Stroke in his maternal grandmother.    Medications and Allergies     Medications  Outpatient Medications Marked as Taking for the 5/17/22 encounter (Office Visit) with Renee Baez MD   Medication Sig Dispense Refill    [DISCONTINUED] losartan (COZAAR) 100 MG tablet Take 100 mg by mouth once daily at 6am.         Allergies  Review of patient's allergies indicates:  No Known Allergies    Physical Examination     Vitals:    05/17/22 1522   BP: 128/79   Pulse: 79   Resp: 16   Temp: 98.4 °F (36.9 °C)     Physical Exam  Vitals reviewed.   Constitutional:       Appearance: Normal appearance.   HENT:      Head: Normocephalic.      Right Ear: Tympanic membrane, ear canal and external ear normal.      Left Ear: Tympanic membrane, ear canal and external ear  normal.      Nose: Nose normal.      Mouth/Throat:      Mouth: Mucous membranes are moist.   Eyes:      Extraocular Movements: Extraocular movements intact.      Pupils: Pupils are equal, round, and reactive to light.   Neck:      Vascular: No carotid bruit.   Cardiovascular:      Rate and Rhythm: Normal rate and regular rhythm.      Pulses: Normal pulses.   Pulmonary:      Effort: Pulmonary effort is normal.      Breath sounds: Normal breath sounds.   Abdominal:      General: Abdomen is flat. Bowel sounds are normal.      Palpations: There is no mass.      Tenderness: There is no abdominal tenderness.   Musculoskeletal:      Cervical back: Normal range of motion. No tenderness.      Comments: L ankle has medial effusion and it goes down onto foot, there is tenderness medially, ROM is reduced   Neurological:      General: No focal deficit present.      Mental Status: He is alert and oriented to person, place, and time.   Psychiatric:         Mood and Affect: Mood normal.         Behavior: Behavior normal.         Thought Content: Thought content normal.         Judgment: Judgment normal.           Assessment and Plan (including Health Maintenance)      Problem List  Smart Sets  Document Outside HM   :    Plan:   Lab last year was not bad will do nonfasting today since he delays return for fasting.  HTN doing well, continue Losartan and Amlodipine  Ankle and knee pain, get him back to Dr Álvarez for evaluation, discussed he may need to be in a boot.   Keep follow up for testicular cancer    Health Maintenance Due   Topic Date Due    Hepatitis C Screening  Never done    Pneumococcal Vaccines (Age 0-64) (1 - PCV) Never done    HIV Screening  Never done    TETANUS VACCINE  Never done    COVID-19 Vaccine (3 - Booster for Moderna series) 10/13/2021       Problem List Items Addressed This Visit        Cardiac/Vascular    Hypertension    Relevant Orders    Comprehensive Metabolic Panel    Lipid Panel    Mixed  hyperlipidemia    Relevant Orders    Lipid Panel    TSH       Oncology    Malignant neoplasm of testicle    Relevant Orders    Urinalysis, Reflex to Urine Culture Urine, Clean Catch      Other Visit Diagnoses     Wellness examination    -  Primary    Relevant Orders    CBC Auto Differential    Comprehensive Metabolic Panel    Lipid Panel    TSH    Urinalysis, Reflex to Urine Culture Urine, Clean Catch    Hemoglobin A1C    Chronic pain of left ankle        Relevant Orders    Ambulatory referral/consult to Orthopedics          Health Maintenance Topics with due status: Not Due       Topic Last Completion Date    Influenza Vaccine 10/14/2013    Lipid Panel 04/13/2021       Future Appointments   Date Time Provider Department Center   10/26/2022  4:00 PM Renee Baez MD [facility] P[facility]            Signature:  Renee Baez MD  [facility]  [address] [place] [address]    Date of encounter: 5/17/22

## 2022-05-18 ENCOUNTER — PATIENT MESSAGE (OUTPATIENT)
Dept: PRIMARY CARE CLINIC | Facility: CLINIC | Age: 37
End: 2022-05-18
Payer: COMMERCIAL

## 2022-05-18 ENCOUNTER — TELEPHONE (OUTPATIENT)
Dept: PRIMARY CARE CLINIC | Facility: CLINIC | Age: 37
End: 2022-05-18
Payer: COMMERCIAL

## 2022-05-18 NOTE — TELEPHONE ENCOUNTER
----- Message from Renee Baez MD sent at 5/18/2022  2:13 PM CDT -----  The thyroid is good, no diabetes, his cholesterol is a little higher but I don't think he was fasting, watch the fat in diet. His kidney function went up, I think he's due a renal scan with urology, we'll send Dr Wolfe the results. He is still anemic but less than last time. We'll send to GI, Dr IRA Alvarez Urine looks ok.

## 2022-05-26 ENCOUNTER — LAB VISIT (OUTPATIENT)
Dept: LAB | Facility: HOSPITAL | Age: 37
End: 2022-05-26
Attending: INTERNAL MEDICINE
Payer: COMMERCIAL

## 2022-05-26 DIAGNOSIS — D64.89 OTHER SPECIFIED ANEMIAS: Primary | ICD-10-CM

## 2022-05-26 LAB
FERRITIN SERPL-MCNC: 252.36 NG/ML (ref 21.81–274.66)
FOLATE SERPL-MCNC: 18.7 NG/ML (ref 7–31.4)
IRON SATN MFR SERPL: 24 % (ref 20–50)
IRON SERPL-MCNC: 51 UG/DL (ref 65–175)
T4 FREE SERPL-MCNC: 0.93 NG/DL (ref 0.7–1.48)
TIBC SERPL-MCNC: 166 UG/DL (ref 69–240)
TIBC SERPL-MCNC: 217 UG/DL (ref 250–450)
TRANSFERRIN SERPL-MCNC: 199 MG/DL (ref 174–364)
TSH SERPL-ACNC: 1.37 UIU/ML (ref 0.35–4.94)

## 2022-05-26 PROCEDURE — 82746 ASSAY OF FOLIC ACID SERUM: CPT

## 2022-05-26 PROCEDURE — 83540 ASSAY OF IRON: CPT

## 2022-05-26 PROCEDURE — 36415 COLL VENOUS BLD VENIPUNCTURE: CPT

## 2022-05-26 PROCEDURE — 84439 ASSAY OF FREE THYROXINE: CPT

## 2022-05-26 PROCEDURE — 84443 ASSAY THYROID STIM HORMONE: CPT

## 2022-05-26 PROCEDURE — 82728 ASSAY OF FERRITIN: CPT

## 2022-08-06 ENCOUNTER — OFFICE VISIT (OUTPATIENT)
Dept: URGENT CARE | Facility: CLINIC | Age: 37
End: 2022-08-06
Payer: COMMERCIAL

## 2022-08-06 VITALS
WEIGHT: 205 LBS | HEIGHT: 69 IN | DIASTOLIC BLOOD PRESSURE: 90 MMHG | RESPIRATION RATE: 18 BRPM | OXYGEN SATURATION: 98 % | BODY MASS INDEX: 30.36 KG/M2 | SYSTOLIC BLOOD PRESSURE: 149 MMHG | TEMPERATURE: 100 F | HEART RATE: 88 BPM

## 2022-08-06 DIAGNOSIS — U07.1 COVID-19: ICD-10-CM

## 2022-08-06 DIAGNOSIS — R09.81 NASAL CONGESTION: Primary | ICD-10-CM

## 2022-08-06 DIAGNOSIS — U07.1 COVID-19 VIRUS DETECTED: ICD-10-CM

## 2022-08-06 LAB
CTP QC/QA: YES
SARS-COV-2 RDRP RESP QL NAA+PROBE: POSITIVE

## 2022-08-06 PROCEDURE — U0002: ICD-10-PCS | Mod: QW,,, | Performed by: PHYSICIAN ASSISTANT

## 2022-08-06 PROCEDURE — U0002 COVID-19 LAB TEST NON-CDC: HCPCS | Mod: QW,,, | Performed by: PHYSICIAN ASSISTANT

## 2022-08-06 PROCEDURE — 4010F PR ACE/ARB THEARPY RXD/TAKEN: ICD-10-PCS | Mod: CPTII,,, | Performed by: PHYSICIAN ASSISTANT

## 2022-08-06 PROCEDURE — 3077F PR MOST RECENT SYSTOLIC BLOOD PRESSURE >= 140 MM HG: ICD-10-PCS | Mod: CPTII,,, | Performed by: PHYSICIAN ASSISTANT

## 2022-08-06 PROCEDURE — 99213 OFFICE O/P EST LOW 20 MIN: CPT | Mod: ,,, | Performed by: PHYSICIAN ASSISTANT

## 2022-08-06 PROCEDURE — 1160F PR REVIEW ALL MEDS BY PRESCRIBER/CLIN PHARMACIST DOCUMENTED: ICD-10-PCS | Mod: CPTII,,, | Performed by: PHYSICIAN ASSISTANT

## 2022-08-06 PROCEDURE — 99213 PR OFFICE/OUTPT VISIT, EST, LEVL III, 20-29 MIN: ICD-10-PCS | Mod: ,,, | Performed by: PHYSICIAN ASSISTANT

## 2022-08-06 PROCEDURE — 1160F RVW MEDS BY RX/DR IN RCRD: CPT | Mod: CPTII,,, | Performed by: PHYSICIAN ASSISTANT

## 2022-08-06 PROCEDURE — 1159F PR MEDICATION LIST DOCUMENTED IN MEDICAL RECORD: ICD-10-PCS | Mod: CPTII,,, | Performed by: PHYSICIAN ASSISTANT

## 2022-08-06 PROCEDURE — 3008F BODY MASS INDEX DOCD: CPT | Mod: CPTII,,, | Performed by: PHYSICIAN ASSISTANT

## 2022-08-06 PROCEDURE — 3008F PR BODY MASS INDEX (BMI) DOCUMENTED: ICD-10-PCS | Mod: CPTII,,, | Performed by: PHYSICIAN ASSISTANT

## 2022-08-06 PROCEDURE — 4010F ACE/ARB THERAPY RXD/TAKEN: CPT | Mod: CPTII,,, | Performed by: PHYSICIAN ASSISTANT

## 2022-08-06 PROCEDURE — 3077F SYST BP >= 140 MM HG: CPT | Mod: CPTII,,, | Performed by: PHYSICIAN ASSISTANT

## 2022-08-06 PROCEDURE — 3080F DIAST BP >= 90 MM HG: CPT | Mod: CPTII,,, | Performed by: PHYSICIAN ASSISTANT

## 2022-08-06 PROCEDURE — 1159F MED LIST DOCD IN RCRD: CPT | Mod: CPTII,,, | Performed by: PHYSICIAN ASSISTANT

## 2022-08-06 PROCEDURE — 3080F PR MOST RECENT DIASTOLIC BLOOD PRESSURE >= 90 MM HG: ICD-10-PCS | Mod: CPTII,,, | Performed by: PHYSICIAN ASSISTANT

## 2022-08-06 NOTE — LETTER
August 6, 2022      [facility]  [address] [address]  Phone: [phone]       Patient: Robb Carballo   YOB: 1985  Date of Visit: 08/06/2022    To Whom It May Concern:    Jonna Carballo  was at [facility] on 08/06/2022. The patient may return to work/school on 08/12/2022 without restrictions. If you have any questions or concerns, or if I can be of further assistance, please do not hesitate to contact me.    Sincerely,    Bhargavi Gonzales MA

## 2022-08-06 NOTE — PROGRESS NOTES
"Subjective:       Patient ID: Robb Carballo is a 37 y.o. male.    Vitals:  height is 5' 9" (1.753 m) and weight is 93 kg (205 lb). His oral temperature is 100.1 °F (37.8 °C). His blood pressure is 149/90 (abnormal) and his pulse is 88. His respiration is 18 and oxygen saturation is 98%.     Chief Complaint: Nasal Congestion    Patient has headache and congestion. Patient took at home covid test 8/4 and it was negative.    Patient is a 37-year-old male complains of 1 day history of nasal congestion cough and headache.  Positive for COVID exposure at work.  Denies any neck stiffness rash shortness of breath.    ROS    Objective:      Physical Exam   Constitutional: He is oriented to person, place, and time. He appears well-developed. He is cooperative.  Non-toxic appearance. He does not appear ill. No distress.   HENT:   Head: Normocephalic and atraumatic.   Ears:   Right Ear: Hearing, tympanic membrane, external ear and ear canal normal.   Left Ear: Hearing, tympanic membrane, external ear and ear canal normal.   Nose: Nose normal. No mucosal edema, rhinorrhea or nasal deformity. No epistaxis. Right sinus exhibits no maxillary sinus tenderness and no frontal sinus tenderness. Left sinus exhibits no maxillary sinus tenderness and no frontal sinus tenderness.   Mouth/Throat: Uvula is midline, oropharynx is clear and moist and mucous membranes are normal. No trismus in the jaw. Normal dentition. No uvula swelling. No oropharyngeal exudate, posterior oropharyngeal edema or posterior oropharyngeal erythema.   Eyes: Conjunctivae and lids are normal. No scleral icterus.   Neck: Trachea normal and phonation normal. Neck supple. No edema present. No erythema present. No neck rigidity present.   Cardiovascular: Normal rate, regular rhythm, normal heart sounds and normal pulses.   Pulmonary/Chest: Effort normal and breath sounds normal. No respiratory distress. He has no decreased breath sounds. He has no rhonchi. "   Abdominal: Normal appearance.   Musculoskeletal: Normal range of motion.         General: No deformity. Normal range of motion.   Neurological: He is alert and oriented to person, place, and time. He exhibits normal muscle tone. Coordination normal.   Skin: Skin is warm, dry, intact, not diaphoretic and not pale.   Psychiatric: His speech is normal and behavior is normal. Judgment and thought content normal.   Nursing note and vitals reviewed.         Previous History      Review of patient's allergies indicates:  No Known Allergies    Past Medical History:   Diagnosis Date    Alkaline phosphatase elevation     Colitis     COVID-19     Helicobacter pylori gastrointestinal tract infection 06/04/2021    HTN (hypertension)     Hx of testicular cancer     Mixed hyperlipidemia     Obesity, unspecified     Small kidney      Current Outpatient Medications   Medication Instructions    amLODIPine (NORVASC) 10 mg, Oral, Daily    losartan (COZAAR) 100 MG tablet Take 1 tablet by mouth once daily    molnupiravir 800 mg, Oral, Every 12 hours     Past Surgical History:   Procedure Laterality Date    ANTERIOR CRUCIATE LIGAMENT REPAIR N/A     COLONOSCOPY W/ BIOPSIES AND POLYPECTOMY      ESOPHAGOGASTRODUODENOSCOPY N/A 06/04/2021    with biopsy    ORCHIECTOMY      RETROPERITONEAL MASS EXCISION       Family History   Problem Relation Age of Onset    Hypertension Mother     Stroke Maternal Grandmother     Diabetes Maternal Grandmother     Heart failure Maternal Grandmother     Diabetes Maternal Grandfather     Heart failure Maternal Grandfather     Stomach cancer Maternal Grandfather     Coronary artery disease Maternal Grandfather     Heart failure Paternal Grandmother     Pancreatic cancer Paternal Grandmother        Social History     Tobacco Use    Smoking status: Never Smoker    Smokeless tobacco: Never Used   Substance Use Topics    Alcohol use: Not Currently        Physical Exam      Vital Signs  "Reviewed   BP (!) 149/90   Pulse 88   Temp 100.1 °F (37.8 °C) (Oral)   Resp 18   Ht 5' 9" (1.753 m)   Wt 93 kg (205 lb)   SpO2 98%   BMI 30.27 kg/m²        Procedures    Procedures     Labs     Results for orders placed or performed in visit on 08/06/22   POCT COVID-19 Rapid Screening   Result Value Ref Range    POC Rapid COVID Positive (A) Negative     Acceptable Yes          Assessment:       1. Nasal congestion    2. COVID-19          Plan:         Nasal congestion  -     POCT COVID-19 Rapid Screening    COVID-19  -     molnupiravir 200 mg capsule (EUA); Take 4 capsules (800 mg total) by mouth every 12 (twelve) hours. for 5 days  Dispense: 40 capsule; Refill: 0    Drink plenty of fluids    Get plenty of rest.    Follow-up with your primary care doctor      Go to emergency department with any significant change or worsening symptoms.    Tylenol or Motrin as needed for fever.     Please quarantine for 5 days. On day 6 of illness you can return to work/school as long as you have not experienced fever in the last 24 hours.     Please add vitamin C, vitamin D, and Zinc if you do not already take these.                  "

## 2022-09-15 ENCOUNTER — HISTORICAL (OUTPATIENT)
Dept: ADMINISTRATIVE | Facility: HOSPITAL | Age: 37
End: 2022-09-15
Payer: COMMERCIAL

## 2022-10-26 ENCOUNTER — OFFICE VISIT (OUTPATIENT)
Dept: PRIMARY CARE CLINIC | Facility: CLINIC | Age: 37
End: 2022-10-26
Payer: COMMERCIAL

## 2022-10-26 VITALS
RESPIRATION RATE: 16 BRPM | HEART RATE: 75 BPM | BODY MASS INDEX: 29.47 KG/M2 | TEMPERATURE: 98 F | SYSTOLIC BLOOD PRESSURE: 128 MMHG | HEIGHT: 69 IN | OXYGEN SATURATION: 98 % | WEIGHT: 199 LBS | DIASTOLIC BLOOD PRESSURE: 84 MMHG

## 2022-10-26 DIAGNOSIS — E78.2 MIXED HYPERLIPIDEMIA: ICD-10-CM

## 2022-10-26 DIAGNOSIS — D50.0 IRON DEFICIENCY ANEMIA DUE TO CHRONIC BLOOD LOSS: ICD-10-CM

## 2022-10-26 DIAGNOSIS — I10 PRIMARY HYPERTENSION: Primary | ICD-10-CM

## 2022-10-26 DIAGNOSIS — C62.90 MALIGNANT NEOPLASM OF TESTICLE, UNSPECIFIED LATERALITY, UNSPECIFIED WHETHER DESCENDED OR UNDESCENDED: ICD-10-CM

## 2022-10-26 DIAGNOSIS — N18.31 STAGE 3A CHRONIC KIDNEY DISEASE: ICD-10-CM

## 2022-10-26 DIAGNOSIS — N27.9 SMALL KIDNEY: ICD-10-CM

## 2022-10-26 LAB
ANION GAP SERPL CALC-SCNC: 10 MEQ/L
BASOPHILS # BLD AUTO: 0.04 X10(3)/MCL (ref 0–0.2)
BASOPHILS NFR BLD AUTO: 0.5 %
BUN SERPL-MCNC: 12.8 MG/DL (ref 8.9–20.6)
CALCIUM SERPL-MCNC: 9.5 MG/DL (ref 8.4–10.2)
CHLORIDE SERPL-SCNC: 104 MMOL/L (ref 98–107)
CO2 SERPL-SCNC: 27 MMOL/L (ref 22–29)
CREAT SERPL-MCNC: 1.08 MG/DL (ref 0.73–1.18)
CREAT/UREA NIT SERPL: 12
EOSINOPHIL # BLD AUTO: 0.03 X10(3)/MCL (ref 0–0.9)
EOSINOPHIL NFR BLD AUTO: 0.3 %
ERYTHROCYTE [DISTWIDTH] IN BLOOD BY AUTOMATED COUNT: 13.4 % (ref 11.5–17)
GFR SERPLBLD CREATININE-BSD FMLA CKD-EPI: >60 MLS/MIN/1.73/M2
GLUCOSE SERPL-MCNC: 102 MG/DL (ref 74–100)
HCT VFR BLD AUTO: 41.1 % (ref 42–52)
HGB BLD-MCNC: 13.8 GM/DL (ref 14–18)
IMM GRANULOCYTES # BLD AUTO: 0.01 X10(3)/MCL (ref 0–0.04)
IMM GRANULOCYTES NFR BLD AUTO: 0.1 %
IRON SATN MFR SERPL: 28 % (ref 20–50)
IRON SERPL-MCNC: 76 UG/DL (ref 65–175)
LYMPHOCYTES # BLD AUTO: 1.52 X10(3)/MCL (ref 0.6–4.6)
LYMPHOCYTES NFR BLD AUTO: 17.1 %
MCH RBC QN AUTO: 27.3 PG (ref 27–31)
MCHC RBC AUTO-ENTMCNC: 33.6 MG/DL (ref 33–36)
MCV RBC AUTO: 81.4 FL (ref 80–94)
MONOCYTES # BLD AUTO: 0.59 X10(3)/MCL (ref 0.1–1.3)
MONOCYTES NFR BLD AUTO: 6.7 %
NEUTROPHILS # BLD AUTO: 6.7 X10(3)/MCL (ref 2.1–9.2)
NEUTROPHILS NFR BLD AUTO: 75.3 %
PLATELET # BLD AUTO: 329 X10(3)/MCL (ref 130–400)
PMV BLD AUTO: 10.7 FL (ref 7.4–10.4)
POTASSIUM SERPL-SCNC: 4 MMOL/L (ref 3.5–5.1)
RBC # BLD AUTO: 5.05 X10(6)/MCL (ref 4.7–6.1)
SODIUM SERPL-SCNC: 141 MMOL/L (ref 136–145)
TIBC SERPL-MCNC: 196 UG/DL (ref 69–240)
TIBC SERPL-MCNC: 272 UG/DL (ref 250–450)
TRANSFERRIN SERPL-MCNC: 232 MG/DL (ref 174–364)
WBC # SPEC AUTO: 8.9 X10(3)/MCL (ref 4.5–11.5)

## 2022-10-26 PROCEDURE — 3079F PR MOST RECENT DIASTOLIC BLOOD PRESSURE 80-89 MM HG: ICD-10-PCS | Mod: CPTII,,, | Performed by: INTERNAL MEDICINE

## 2022-10-26 PROCEDURE — 36415 COLL VENOUS BLD VENIPUNCTURE: CPT | Performed by: INTERNAL MEDICINE

## 2022-10-26 PROCEDURE — 1160F RVW MEDS BY RX/DR IN RCRD: CPT | Mod: CPTII,,, | Performed by: INTERNAL MEDICINE

## 2022-10-26 PROCEDURE — 1160F PR REVIEW ALL MEDS BY PRESCRIBER/CLIN PHARMACIST DOCUMENTED: ICD-10-PCS | Mod: CPTII,,, | Performed by: INTERNAL MEDICINE

## 2022-10-26 PROCEDURE — 3079F DIAST BP 80-89 MM HG: CPT | Mod: CPTII,,, | Performed by: INTERNAL MEDICINE

## 2022-10-26 PROCEDURE — 4010F PR ACE/ARB THEARPY RXD/TAKEN: ICD-10-PCS | Mod: CPTII,,, | Performed by: INTERNAL MEDICINE

## 2022-10-26 PROCEDURE — 1159F MED LIST DOCD IN RCRD: CPT | Mod: CPTII,,, | Performed by: INTERNAL MEDICINE

## 2022-10-26 PROCEDURE — 3074F SYST BP LT 130 MM HG: CPT | Mod: CPTII,,, | Performed by: INTERNAL MEDICINE

## 2022-10-26 PROCEDURE — 4010F ACE/ARB THERAPY RXD/TAKEN: CPT | Mod: CPTII,,, | Performed by: INTERNAL MEDICINE

## 2022-10-26 PROCEDURE — 1159F PR MEDICATION LIST DOCUMENTED IN MEDICAL RECORD: ICD-10-PCS | Mod: CPTII,,, | Performed by: INTERNAL MEDICINE

## 2022-10-26 PROCEDURE — 99213 OFFICE O/P EST LOW 20 MIN: CPT | Mod: ,,, | Performed by: INTERNAL MEDICINE

## 2022-10-26 PROCEDURE — 85025 COMPLETE CBC W/AUTO DIFF WBC: CPT | Performed by: INTERNAL MEDICINE

## 2022-10-26 PROCEDURE — 36415 PR COLLECTION VENOUS BLOOD,VENIPUNCTURE: ICD-10-PCS | Mod: ,,, | Performed by: INTERNAL MEDICINE

## 2022-10-26 PROCEDURE — 99213 PR OFFICE/OUTPT VISIT, EST, LEVL III, 20-29 MIN: ICD-10-PCS | Mod: ,,, | Performed by: INTERNAL MEDICINE

## 2022-10-26 PROCEDURE — 83540 ASSAY OF IRON: CPT | Performed by: INTERNAL MEDICINE

## 2022-10-26 PROCEDURE — 3074F PR MOST RECENT SYSTOLIC BLOOD PRESSURE < 130 MM HG: ICD-10-PCS | Mod: CPTII,,, | Performed by: INTERNAL MEDICINE

## 2022-10-26 PROCEDURE — 80048 BASIC METABOLIC PNL TOTAL CA: CPT | Performed by: INTERNAL MEDICINE

## 2022-10-26 PROCEDURE — 36415 COLL VENOUS BLD VENIPUNCTURE: CPT | Mod: ,,, | Performed by: INTERNAL MEDICINE

## 2022-10-26 NOTE — PROGRESS NOTES
Renee Baez MD   [address] [place] [address]     PATIENT NAME: Robb Carballo  : 1985  DATE: 10/26/22  MRN: [id number]      Billing Provider: Renee Baez MD  Level of Service: NC OFFICE/OUTPT VISIT, EST, LEVL III, 20-29 MIN  Patient PCP Information       Provider PCP Type    Renee Baez MD General            Reason for Visit / Chief Complaint: 6 months follow up       Update PCP  Update Chief Complaint         History of Present Illness / Problem Focused Workflow     Robb Carballo presents to the clinic with 6 months follow up     Here for follow up. He's been doing well, he had a bone spur in foot but he got steroids and it helped. He also got an injection in the knee and it helped but it's starting to come back. He has an ACL tear. Goes once a year to urology. He is going on a cruise to [place].       Review of Systems     Review of Systems   Constitutional: Negative.    HENT: Negative.     Respiratory: Negative.     Cardiovascular: Negative.    Gastrointestinal: Negative.    Endocrine: Negative.    Genitourinary: Negative.         Sees urology annually   Musculoskeletal:         It will be Dr Álvarez doing the surgery.    Neurological: Negative.    Psychiatric/Behavioral: Negative.       Medical / Social / Family History     Past Medical History:   Diagnosis Date    Alkaline phosphatase elevation     Colitis     COVID-19     Helicobacter pylori gastrointestinal tract infection 2021    HTN (hypertension)     Hx of testicular cancer     Mixed hyperlipidemia     Obesity, unspecified     Small kidney        Past Surgical History:   Procedure Laterality Date    ANTERIOR CRUCIATE LIGAMENT REPAIR N/A     COLONOSCOPY W/ BIOPSIES AND POLYPECTOMY      ESOPHAGOGASTRODUODENOSCOPY N/A 2021    with biopsy    MEDIPORT INSERTION, SINGLE  2007    ORCHIECTOMY Left 2007    Dr Wolfe    RETROPERITONEAL MASS EXCISION         Social History  Mr. Carballo      reports that he has never smoked.  He has never used smokeless tobacco. He reports that he does not currently use alcohol. He reports that he does not use drugs.    Family History  's [place]   family history includes Coronary artery disease in his maternal grandfather; Diabetes in his maternal grandfather and maternal grandmother; Heart failure in his maternal grandfather, maternal grandmother, and paternal grandmother; Hypertension in his mother; Pancreatic cancer in his paternal grandmother; Stomach cancer in his maternal grandfather; Stroke in his maternal grandmother.    Medications and Allergies     Medications  Outpatient Medications Marked as Taking for the 10/26/22 encounter (Office Visit) with Renee Baez MD   Medication Sig Dispense Refill    amLODIPine (NORVASC) 10 MG tablet Take 1 tablet by mouth once daily 90 tablet 0    losartan (COZAAR) 100 MG tablet Take 1 tablet by mouth once daily 90 tablet 3       Allergies  Review of patient's allergies indicates:  No Known Allergies    Physical Examination     Vitals:    10/26/22 1628   BP: 128/84   Pulse: 75   Resp:    Temp:      Physical Exam  Vitals reviewed.   Constitutional:       Appearance: Normal appearance.   HENT:      Right Ear: Tympanic membrane, ear canal and external ear normal.      Left Ear: Tympanic membrane, ear canal and external ear normal.   Cardiovascular:      Rate and Rhythm: Normal rate and regular rhythm.   Pulmonary:      Effort: Pulmonary effort is normal.      Breath sounds: Normal breath sounds.   Abdominal:      General: Abdomen is flat.      Palpations: Abdomen is soft.   Musculoskeletal:      Comments: Moving knee well today   Skin:     General: Skin is warm and dry.   Neurological:      Mental Status: He is alert.         Assessment and Plan (including Health Maintenance)      Problem List  Smart Sets  Document Outside HM   :  Historical on 09/15/2022   Component Date Value    Inflenza A Ag 12/16/2017 Positive     Influenza B Ag 12/16/2017 Negative     Historical on 09/15/2022   Component Date Value    Cholesterol Total 11/28/2017 181     HDL Cholesterol 11/28/2017 46     LDL Cholesterol 11/28/2017 118 (H)     Triglyceride 11/28/2017 87     Very Low Density Lipopro* 11/28/2017 17    Office Visit on 08/06/2022   Component Date Value    POC Rapid COVID 08/06/2022 Positive (A)      Acceptab* 08/06/2022 Yes    Lab Visit on 05/26/2022   Component Date Value    Iron Binding Capacity Un* 05/26/2022 166     Iron Level 05/26/2022 51 (L)     Transferrin 05/26/2022 199     Iron Binding Capacity To* 05/26/2022 217 (L)     Iron Saturation 05/26/2022 24     Ferritin Level 05/26/2022 252.36     Folate Level 05/26/2022 18.7     Thyroid Stimulating Horm* 05/26/2022 1.3674     Thyroxine Free 05/26/2022 0.93    Office Visit on 05/17/2022   Component Date Value    Hemoglobin A1c 05/17/2022 5.3     Estimated Average Glucose 05/17/2022 105.4     Color, UA 05/17/2022 Yellow     Appearance, UA 05/17/2022 Clear     Specific Gravity, UA 05/17/2022 1.020     pH, UA 05/17/2022 6.0     Protein, UA 05/17/2022 Negative     Glucose, UA 05/17/2022 Negative     Ketones, UA 05/17/2022 Negative     Blood, UA 05/17/2022 Negative     Bilirubin, UA 05/17/2022 Negative     Urobilinogen, UA 05/17/2022 0.2     Nitrites, UA 05/17/2022 Negative     Leukocyte Esterase, UA 05/17/2022 Negative     Thyroid Stimulating Horm* 05/17/2022 1.4430     Cholesterol Total 05/17/2022 193     HDL Cholesterol 05/17/2022 41     Triglyceride 05/17/2022 110     Cholesterol/HDL Ratio 05/17/2022 5     Very Low Density Lipopro* 05/17/2022 22     LDL Cholesterol 05/17/2022 130.00     Sodium Level 05/17/2022 139     Potassium Level 05/17/2022 4.3     Chloride 05/17/2022 102     Carbon Dioxide 05/17/2022 25     Glucose Level 05/17/2022 89     Blood Urea Nitrogen 05/17/2022 16.9     Creatinine 05/17/2022 1.24 (H)     Calcium Level Total 05/17/2022 9.3     Protein Total 05/17/2022 7.6     Albumin Level 05/17/2022 3.7      Globulin 05/17/2022 3.9 (H)     Albumin/Globulin Ratio 05/17/2022 0.9 (L)     Bilirubin Total 05/17/2022 0.3     Alkaline Phosphatase 05/17/2022 115     Alanine Aminotransferase 05/17/2022 14     Aspartate Aminotransfera* 05/17/2022 15     WBC 05/17/2022 10.7     RBC 05/17/2022 4.43 (L)     Hgb 05/17/2022 11.8 (L)     Hct 05/17/2022 35.4 (L)     MCV 05/17/2022 79.9 (L)     MCH 05/17/2022 26.6 (L)     MCHC 05/17/2022 33.3     RDW 05/17/2022 13.2     Platelet 05/17/2022 432 (H)     MPV 05/17/2022 9.8     Neut % 05/17/2022 77.0     Lymph % 05/17/2022 16.3     Mono % 05/17/2022 5.5     Eos % 05/17/2022 0.6     Basophil % 05/17/2022 0.4     Lymph # 05/17/2022 1.74     Neut # 05/17/2022 8.2     Mono # 05/17/2022 0.59     Eos # 05/17/2022 0.06     Baso # 05/17/2022 0.04     IG# 05/17/2022 0.02 (H)     IG% 05/17/2022 0.2     Bacteria, UA 05/17/2022 None Seen     RBC, UA 05/17/2022 None Seen     WBC, UA 05/17/2022 None Seen     Squamous Epithelial Cell* 05/17/2022 None Seen          Plan:         Health Maintenance Due   Topic Date Due    Hepatitis C Screening  Never done    Pneumococcal Vaccines (Age 0-64) (1 - PCV) Never done    HIV Screening  Never done    TETANUS VACCINE  Never done    COVID-19 Vaccine (3 - Booster for Moderna series) 07/08/2021    Influenza Vaccine (1) 09/01/2022       Problem List Items Addressed This Visit          Cardiac/Vascular    Primary hypertension - Primary    Current Assessment & Plan     Doing well on Amlodipine and Losartan Rx         Relevant Orders    Basic Metabolic Panel    Mixed hyperlipidemia       Renal/    Small kidney       Oncology    Malignant neoplasm of testicle    Current Assessment & Plan     Diagnosis 8/13/2007 with surgery with Dr Wolfe. Did chemo with Dr Gutierres Cisplatin, VA-16 and Bleomycin  Has follow up with Dr Wolfe.           Other Visit Diagnoses       Stage 3a chronic kidney disease        Has had chemo and multiple dye tests as well as HTN, will  recheck today.     Relevant Orders    Basic Metabolic Panel    Iron deficiency anemia due to chronic blood loss        Iron was low in May, recheck now. He is taking OTC    Relevant Orders    CBC Auto Differential    Iron and TIBC            Health Maintenance Topics with due status: Not Due       Topic Last Completion Date    Lipid Panel 05/17/2022       Future Appointments   Date Time Provider Department Center   4/26/2023  3:40 PM Renee Baez MD [facility] [place] [facility]              Signature:  Renee Baez MD  [facility]  [address] [place] [address]    Date of encounter: 10/26/22

## 2022-10-26 NOTE — ASSESSMENT & PLAN NOTE
Diagnosis 8/13/2007 with surgery with Dr Wolfe. Did chemo with Dr Gutierres Cisplatin, VA-16 and Bleomycin  Has follow up with Dr Wolfe.

## 2022-11-01 ENCOUNTER — TELEPHONE (OUTPATIENT)
Dept: PRIMARY CARE CLINIC | Facility: CLINIC | Age: 37
End: 2022-11-01
Payer: COMMERCIAL

## 2022-11-01 NOTE — TELEPHONE ENCOUNTER
----- Message from Renee Baez MD sent at 10/31/2022  6:22 PM CDT -----  Kidney function looked much better, stay hydrated! Iron levels and anemia are better too so keep it up.

## 2022-12-17 ENCOUNTER — OFFICE VISIT (OUTPATIENT)
Dept: URGENT CARE | Facility: CLINIC | Age: 37
End: 2022-12-17
Payer: COMMERCIAL

## 2022-12-17 VITALS
BODY MASS INDEX: 29.47 KG/M2 | RESPIRATION RATE: 18 BRPM | DIASTOLIC BLOOD PRESSURE: 87 MMHG | OXYGEN SATURATION: 99 % | HEART RATE: 80 BPM | WEIGHT: 199 LBS | HEIGHT: 69 IN | SYSTOLIC BLOOD PRESSURE: 143 MMHG | TEMPERATURE: 99 F

## 2022-12-17 DIAGNOSIS — R09.81 HEAD CONGESTION: Primary | ICD-10-CM

## 2022-12-17 DIAGNOSIS — J32.9 SINUSITIS, UNSPECIFIED CHRONICITY, UNSPECIFIED LOCATION: ICD-10-CM

## 2022-12-17 LAB
CTP QC/QA: YES
CTP QC/QA: YES
POC MOLECULAR INFLUENZA A AGN: NEGATIVE
POC MOLECULAR INFLUENZA B AGN: NEGATIVE
SARS-COV-2 RDRP RESP QL NAA+PROBE: NEGATIVE

## 2022-12-17 PROCEDURE — 87502 POCT INFLUENZA A/B MOLECULAR: ICD-10-PCS | Mod: QW,,,

## 2022-12-17 PROCEDURE — 1160F PR REVIEW ALL MEDS BY PRESCRIBER/CLIN PHARMACIST DOCUMENTED: ICD-10-PCS | Mod: CPTII,,,

## 2022-12-17 PROCEDURE — 3077F SYST BP >= 140 MM HG: CPT | Mod: CPTII,,,

## 2022-12-17 PROCEDURE — 87502 INFLUENZA DNA AMP PROBE: CPT | Mod: QW,,,

## 2022-12-17 PROCEDURE — 99203 OFFICE O/P NEW LOW 30 MIN: CPT | Mod: ,,,

## 2022-12-17 PROCEDURE — 1159F MED LIST DOCD IN RCRD: CPT | Mod: CPTII,,,

## 2022-12-17 PROCEDURE — 99203 PR OFFICE/OUTPT VISIT, NEW, LEVL III, 30-44 MIN: ICD-10-PCS | Mod: ,,,

## 2022-12-17 PROCEDURE — 3079F DIAST BP 80-89 MM HG: CPT | Mod: CPTII,,,

## 2022-12-17 PROCEDURE — 1159F PR MEDICATION LIST DOCUMENTED IN MEDICAL RECORD: ICD-10-PCS | Mod: CPTII,,,

## 2022-12-17 PROCEDURE — 3008F BODY MASS INDEX DOCD: CPT | Mod: CPTII,,,

## 2022-12-17 PROCEDURE — 3079F PR MOST RECENT DIASTOLIC BLOOD PRESSURE 80-89 MM HG: ICD-10-PCS | Mod: CPTII,,,

## 2022-12-17 PROCEDURE — 3077F PR MOST RECENT SYSTOLIC BLOOD PRESSURE >= 140 MM HG: ICD-10-PCS | Mod: CPTII,,,

## 2022-12-17 PROCEDURE — 4010F ACE/ARB THERAPY RXD/TAKEN: CPT | Mod: CPTII,,,

## 2022-12-17 PROCEDURE — 87635: ICD-10-PCS | Mod: QW,,,

## 2022-12-17 PROCEDURE — 1160F RVW MEDS BY RX/DR IN RCRD: CPT | Mod: CPTII,,,

## 2022-12-17 PROCEDURE — 3008F PR BODY MASS INDEX (BMI) DOCUMENTED: ICD-10-PCS | Mod: CPTII,,,

## 2022-12-17 PROCEDURE — 4010F PR ACE/ARB THEARPY RXD/TAKEN: ICD-10-PCS | Mod: CPTII,,,

## 2022-12-17 PROCEDURE — 87635 SARS-COV-2 COVID-19 AMP PRB: CPT | Mod: QW,,,

## 2022-12-17 RX ORDER — AMOXICILLIN AND CLAVULANATE POTASSIUM 875; 125 MG/1; MG/1
1 TABLET, FILM COATED ORAL EVERY 12 HOURS
Qty: 14 TABLET | Refills: 0 | Status: SHIPPED | OUTPATIENT
Start: 2022-12-17 | End: 2022-12-24

## 2022-12-17 NOTE — PATIENT INSTRUCTIONS
Start taking an allergy pill daily such as claritin, zyrtec, allegrea or xyzal. Also start using a nasal steroid spray such as flonase or nasacort daily. If you are not being treated for high blood pressure, you can also take decongestant such as sudafed as needed. They can be purchased over the counter. If oral steroids were prescribed, start them tomorrow morning. Monitor for fever. Take tylenol/acetaminophen or ibuprofen as needed. Rest and hydrate. If symptoms persist or worsen, return to clinic or seek medical attention immediately.     As discussed, this is most likely a viral sinusitis, if symptoms persist or worsen over the next week, it is okay to start the antibiotic. If you do start it, make sure you take all of it, even if you feel better.     As discussed, due to early onset of symptoms in the last 12 hours, you may come back as a nurse visit to be reswabbed in the next 1-2 days if desired.

## 2022-12-17 NOTE — PROGRESS NOTES
"Subjective:       Patient ID: Robb Carballo is a 37 y.o. male.    Vitals:  height is 5' 9" (1.753 m) and weight is 90.3 kg (199 lb). His temperature is 99.2 °F (37.3 °C). His blood pressure is 143/87 (abnormal) and his pulse is 80. His respiration is 18 and oxygen saturation is 99%.     Chief Complaint: Nasal Congestion    A 38 y/o male presents to the clinic with c/o Sinus and head congestion x last night. He denies any body aches, chills, fever, hx of asthma, wheezing, sob, cp, n/v/d, abdominal complaints, rash, difficulty swallowing, neck stiffness, or changes in intake or output. He reports that he has had sinus infections in the past and these feel similar to that. Discussed viral vs bacterial sinuitis, patient agrees with plan to hold on to antibiotics and start only if symptoms persist or worsen.        Constitution: Negative for chills, fatigue and fever.   HENT:  Positive for congestion, postnasal drip, sinus pain and sinus pressure. Negative for sore throat.    Neck: neck negative.   Eyes: Negative.    Respiratory:  Positive for cough. Negative for sputum production, shortness of breath, wheezing and asthma.    Gastrointestinal: Negative.    Genitourinary: Negative.    Musculoskeletal: Negative.    Allergic/Immunologic: Negative.  Negative for asthma.     Objective:      Physical Exam   Constitutional: He is oriented to person, place, and time. He appears well-developed. He is cooperative.  Non-toxic appearance. He does not appear ill. No distress.   HENT:   Head: Normocephalic and atraumatic.   Ears:   Right Ear: Hearing, tympanic membrane and external ear normal.   Left Ear: Hearing, tympanic membrane and external ear normal.   Nose: Rhinorrhea and congestion (clear pnd) present.   Mouth/Throat: Mucous membranes are normal.   Eyes: Conjunctivae and lids are normal.   Neck: Trachea normal and phonation normal. Neck supple. No edema present. No erythema present. No neck rigidity present. "   Cardiovascular: Normal rate, regular rhythm and normal heart sounds.   Pulmonary/Chest: Effort normal and breath sounds normal. No respiratory distress. He has no decreased breath sounds. He has no wheezes.   Abdominal: Normal appearance.   Neurological: He is alert and oriented to person, place, and time. He exhibits normal muscle tone.   Skin: Skin is warm, intact and not diaphoretic. Capillary refill takes less than 2 seconds.   Psychiatric: His speech is normal and behavior is normal. Mood normal.   Nursing note and vitals reviewed.         Previous History      Review of patient's allergies indicates:  No Known Allergies    Past Medical History:   Diagnosis Date    Alkaline phosphatase elevation     Colitis     COVID-19     Helicobacter pylori gastrointestinal tract infection 06/04/2021    HTN (hypertension)     Hx of testicular cancer 2007    Mixed hyperlipidemia     Obesity, unspecified     Small kidney      Current Outpatient Medications   Medication Instructions    amLODIPine (NORVASC) 10 MG tablet Take 1 tablet by mouth once daily    amoxicillin-clavulanate 875-125mg (AUGMENTIN) 875-125 mg per tablet 1 tablet, Oral, Every 12 hours    losartan (COZAAR) 100 MG tablet Take 1 tablet by mouth once daily     Past Surgical History:   Procedure Laterality Date    ANTERIOR CRUCIATE LIGAMENT REPAIR N/A     COLONOSCOPY W/ BIOPSIES AND POLYPECTOMY      ESOPHAGOGASTRODUODENOSCOPY N/A 06/04/2021    with biopsy    MEDIPORT INSERTION, SINGLE  09/02/2007    ORCHIECTOMY Left 08/14/2007    Dr Wolfe    RETROPERITONEAL MASS EXCISION       Family History   Problem Relation Age of Onset    Hypertension Mother     Stroke Maternal Grandmother     Diabetes Maternal Grandmother     Heart failure Maternal Grandmother     Diabetes Maternal Grandfather     Heart failure Maternal Grandfather     Stomach cancer Maternal Grandfather     Coronary artery disease Maternal Grandfather     Heart failure Paternal Grandmother     Pancreatic  "cancer Paternal Grandmother        Social History     Tobacco Use    Smoking status: Never    Smokeless tobacco: Never   Substance Use Topics    Alcohol use: Not Currently    Drug use: Never        Physical Exam      Vital Signs Reviewed   BP (!) 143/87   Pulse 80   Temp 99.2 °F (37.3 °C)   Resp 18   Ht 5' 9" (1.753 m)   Wt 90.3 kg (199 lb)   SpO2 99%   BMI 29.39 kg/m²        Procedures    Procedures     Labs     Results for orders placed or performed in visit on 12/17/22   POCT COVID-19 Rapid Screening   Result Value Ref Range    POC Rapid COVID Negative Negative     Acceptable Yes    POCT Influenza A/B MOLECULAR   Result Value Ref Range    POC Molecular Influenza A Ag Negative Negative, Not Reported    POC Molecular Influenza B Ag Negative Negative, Not Reported     Acceptable Yes        Assessment:       1. Head congestion    2. Sinusitis, unspecified chronicity, unspecified location          Plan:         Head congestion  -     POCT COVID-19 Rapid Screening  -     POCT Influenza A/B MOLECULAR    Sinusitis, unspecified chronicity, unspecified location  -     amoxicillin-clavulanate 875-125mg (AUGMENTIN) 875-125 mg per tablet; Take 1 tablet by mouth every 12 (twelve) hours. for 7 days  Dispense: 14 tablet; Refill: 0    Flu/Covid negative     Start taking an allergy pill daily such as claritin, zyrtec, allegrea or xyzal. Also start using a nasal steroid spray such as flonase or nasacort daily. If you are not being treated for high blood pressure, you can also take decongestant such as sudafed as needed. They can be purchased over the counter. If oral steroids were prescribed, start them tomorrow morning. Monitor for fever. Take tylenol/acetaminophen or ibuprofen as needed. Rest and hydrate. If symptoms persist or worsen, return to clinic or seek medical attention immediately.     As discussed, this is most likely a viral sinusitis, if symptoms persist or worsen over the next " week, it is okay to start the antibiotic. If you do start it, make sure you take all of it, even if you feel better.     As discussed, due to early onset of symptoms in the last 12 hours, you may come back as a nurse visit to be reswabbed in the next 1-2 days if desired.

## 2023-01-04 ENCOUNTER — TELEPHONE (OUTPATIENT)
Dept: PRIMARY CARE CLINIC | Facility: CLINIC | Age: 38
End: 2023-01-04
Payer: COMMERCIAL

## 2023-01-04 NOTE — TELEPHONE ENCOUNTER
----- Message from Edelmira Morgan MA sent at 1/4/2023 11:25 AM CST -----  Regarding: refill on meds  .Type:  RX Refill Request    Who Called: pt    Refill or New Rx:amLODIPine (NORVASC) 10 MG tablet    How is the patient currently taking it? (ex. 1XDay):1 day    Preferred Pharmacy with phone number: [facility] in [place]    Local or Mail Order:[facility]      Best Call Back Number: [phone]    Additional Information: pt is completely out needs refill as soon as possible

## 2023-01-13 LAB — CRC RECOMMENDATION EXT: NORMAL

## 2023-01-18 RX ORDER — SODIUM, POTASSIUM,MAG SULFATES 17.5-3.13G
SOLUTION, RECONSTITUTED, ORAL ORAL
COMMUNITY
Start: 2022-12-13 | End: 2023-05-19 | Stop reason: ALTCHOICE

## 2023-05-11 ENCOUNTER — TELEPHONE (OUTPATIENT)
Dept: PRIMARY CARE CLINIC | Facility: CLINIC | Age: 38
End: 2023-05-11
Payer: COMMERCIAL

## 2023-05-11 NOTE — TELEPHONE ENCOUNTER
No answer; Was not able to leave a voicemail on the patient's phone or his wife's phone.    Patient is scheduled for an appointment with Dr. Baez on 05/11/23 at 10 am.

## 2023-05-17 PROBLEM — U07.1 COVID-19: Status: RESOLVED | Noted: 2022-08-06 | Resolved: 2023-05-17

## 2023-05-17 PROBLEM — Z85.47 HISTORY OF TESTICULAR CANCER: Status: RESOLVED | Noted: 2022-05-17 | Resolved: 2023-05-17

## 2023-05-17 PROBLEM — Z85.47 HISTORY OF TESTICULAR CANCER: Status: ACTIVE | Noted: 2022-05-17

## 2023-05-17 NOTE — PROGRESS NOTES
Renee Baez MD   [id number] [address]     Patient ID: [id number]     Chief Complaint: Annual Exam        HPI:     Robb Carballo is a 38 y.o. male here today for annual exam with history of HTN, Dyslipidemia and testicular cancer . He is watching the diet some. He had his knee drained and it is doing well. His right hip is giving him some issues now.     Subjective:     Review of Systems   Constitutional:         Trying to move more   HENT: Negative.     Eyes: Negative.    Respiratory: Negative.     Cardiovascular: Negative.    Gastrointestinal:         Acidic foods or greasy foods hit his stomach, it's doing okay if he's careful. He's trying to reduce his beer intake but likes the [employer]   Genitourinary:         Urology canceled last time   Musculoskeletal:         ACL is torn on the right knee, he's trying to get active.    Skin:         Boil on buttock draining about a month or two, he does some soaks but not consistent since now he's working nights.    Neurological: Negative.      Past Medical History:   Diagnosis Date    Alkaline phosphatase elevation     Colitis     COVID-19     Helicobacter pylori gastrointestinal tract infection 06/04/2021    HTN (hypertension)     Hx of testicular cancer 2007    Mixed hyperlipidemia     Obesity, unspecified     Personal history of colonic polyps 01/13/2023    Small kidney         Past Surgical History:   Procedure Laterality Date    ANTERIOR CRUCIATE LIGAMENT REPAIR N/A     COLONOSCOPY W/ BIOPSIES  01/13/2023    Dr Alvarez    COLONOSCOPY W/ BIOPSIES AND POLYPECTOMY      ESOPHAGOGASTRODUODENOSCOPY N/A 06/04/2021    with biopsy    MEDIPORT INSERTION, SINGLE  09/02/2007    ORCHIECTOMY Left 08/14/2007    Dr Wolfe    RETROPERITONEAL MASS EXCISION         Family History   Problem Relation Age of Onset    Hypertension Mother     Stroke Maternal Grandmother     Diabetes Maternal Grandmother     Heart failure Maternal Grandmother     Diabetes Maternal Grandfather      Heart failure Maternal Grandfather     Stomach cancer Maternal Grandfather     Coronary artery disease Maternal Grandfather     Heart failure Paternal Grandmother     Pancreatic cancer Paternal Grandmother         Social History     Socioeconomic History    Marital status: Unknown   Tobacco Use    Smoking status: Never    Smokeless tobacco: Never   Substance and Sexual Activity    Alcohol use: Not Currently    Drug use: Never     Social Determinants of Health     Financial Resource Strain: Low Risk     Difficulty of Paying Living Expenses: Not hard at all   Food Insecurity: No Food Insecurity    Worried About Running Out of Food in the Last Year: Never true    Ran Out of Food in the Last Year: Never true   Transportation Needs: No Transportation Needs    Lack of Transportation (Medical): No    Lack of Transportation (Non-Medical): No   Physical Activity: Sufficiently Active    Days of Exercise per Week: 5 days    Minutes of Exercise per Session: 40 min   Stress: No Stress Concern Present    Feeling of Stress : Not at all   Social Connections: Unknown    Frequency of Communication with Friends and Family: Three times a week    Frequency of Social Gatherings with Friends and Family: Twice a week    Attends Rastafari Services: 1 to 4 times per year    Active Member of Clubs or Organizations: No    Attends Club or Organization Meetings: Never    Marital Status: Patient refused   Housing Stability: Low Risk     Unable to Pay for Housing in the Last Year: No    Number of Places Lived in the Last Year: 1    Unstable Housing in the Last Year: No       Review of patient's allergies indicates:  No Known Allergies    Outpatient Medications Marked as Taking for the 5/18/23 encounter (Office Visit) with Renee Baez MD   Medication Sig Dispense Refill    [DISCONTINUED] amLODIPine (NORVASC) 10 MG tablet Take 1 tablet by mouth once daily 90 tablet 1    [DISCONTINUED] losartan (COZAAR) 100 MG tablet Take 1 tablet by mouth once  "daily 90 tablet 3       Patient Care Team:  Renee Baez MD as PCP - General (Internal Medicine)  Hardy Wolfe MD as Consulting Physician (Urology)  Gonzalo Álvarez MD as Consulting Physician (Orthopedic Surgery)  Yakov Alvarez MD as Consulting Physician (Gastroenterology)       Objective:     /86 (BP Location: Left arm, Patient Position: Sitting, BP Method: Large (Automatic))   Pulse 64   Temp 98.1 °F (36.7 °C) (Oral)   Resp 18   Ht 5' 9" (1.753 m)   Wt 88.9 kg (196 lb)   SpO2 98%   BMI 28.94 kg/m²     Physical Exam  Vitals reviewed.   Constitutional:       Appearance: He is not ill-appearing.   HENT:      Head: Normocephalic and atraumatic.   Eyes:      Extraocular Movements: Extraocular movements intact.      Pupils: Pupils are equal, round, and reactive to light.   Cardiovascular:      Rate and Rhythm: Normal rate and regular rhythm.   Pulmonary:      Effort: Pulmonary effort is normal.      Breath sounds: Normal breath sounds. No wheezing.   Abdominal:      General: Abdomen is flat.      Palpations: Abdomen is soft.      Tenderness: There is no abdominal tenderness.      Comments: Incisional scar no hernia   Musculoskeletal:         General: Tenderness present.      Comments: R hip now, knee is good   Skin:     General: Skin is warm and dry.   Neurological:      General: No focal deficit present.      Mental Status: He is alert and oriented to person, place, and time.      Motor: No weakness.      Gait: Gait normal.   Psychiatric:         Mood and Affect: Mood normal.         Behavior: Behavior normal.         Thought Content: Thought content normal.         Judgment: Judgment normal.         Assessment/Problems:       ICD-10-CM ICD-9-CM   1. Wellness examination  Z00.00 V70.0   2. Primary hypertension  I10 401.9   3. Mixed hyperlipidemia  E78.2 272.2   4. History of testicular cancer  Z85.47 V10.47   5. Small kidney  N27.9 589.9   6. Right hip pain  M25.551 719.45   7. Perirectal " abscess  K61.1 566        Plan:     1. Wellness examination  -     CBC Auto Differential  -     Comprehensive Metabolic Panel  -     Hemoglobin A1C  -     Lipid Panel  -     Urinalysis, Reflex to Urine Culture  -     TSH    2. Primary hypertension  Comments:  Would like a little lower with his CKD, refill meds, watch salt  Orders:  -     Comprehensive Metabolic Panel  -     Lipid Panel  -     Urinalysis, Reflex to Urine Culture    3. Mixed hyperlipidemia  Comments:  Levels due, he is fair on diet    4. History of testicular cancer  Comments:  Urology may have discharged he'll look into it.   Orders:  -     CBC Auto Differential    5. Small kidney    6. Right hip pain  -     X-Ray Hip 2 or 3 views Right (with Pelvis when performed); Future; Expected date: 05/18/2023    7. Perirectal abscess  -     Ambulatory referral/consult to Colorectal Surgery; Future; Expected date: 05/25/2023    Other orders  -     amLODIPine (NORVASC) 10 MG tablet; Take 1 tablet (10 mg total) by mouth once daily.  Dispense: 90 tablet; Refill: 3  -     losartan (COZAAR) 100 MG tablet; Take 1 tablet (100 mg total) by mouth once daily.  Dispense: 90 tablet; Refill: 3  -     clindamycin (CLEOCIN) 150 MG capsule; Take 1 capsule (150 mg total) by mouth every 6 (six) hours. for 10 days  Dispense: 40 capsule; Refill: 0             Follow up in about 6 months (around 11/18/2023) for Medication Managment. In addition to their scheduled follow up, the patient has also been instructed to follow up on as needed basis.     Signature:  Renee Baez MD  [facility]  [address] [place] [address]

## 2023-05-18 ENCOUNTER — PATIENT MESSAGE (OUTPATIENT)
Dept: PRIMARY CARE CLINIC | Facility: CLINIC | Age: 38
End: 2023-05-18

## 2023-05-18 ENCOUNTER — OFFICE VISIT (OUTPATIENT)
Dept: PRIMARY CARE CLINIC | Facility: CLINIC | Age: 38
End: 2023-05-18
Payer: COMMERCIAL

## 2023-05-18 VITALS
OXYGEN SATURATION: 98 % | DIASTOLIC BLOOD PRESSURE: 86 MMHG | SYSTOLIC BLOOD PRESSURE: 130 MMHG | RESPIRATION RATE: 18 BRPM | HEIGHT: 69 IN | TEMPERATURE: 98 F | HEART RATE: 64 BPM | WEIGHT: 196 LBS | BODY MASS INDEX: 29.03 KG/M2

## 2023-05-18 DIAGNOSIS — K61.1 PERIRECTAL ABSCESS: ICD-10-CM

## 2023-05-18 DIAGNOSIS — M25.551 RIGHT HIP PAIN: ICD-10-CM

## 2023-05-18 DIAGNOSIS — I10 PRIMARY HYPERTENSION: ICD-10-CM

## 2023-05-18 DIAGNOSIS — E78.2 MIXED HYPERLIPIDEMIA: ICD-10-CM

## 2023-05-18 DIAGNOSIS — Z00.00 WELLNESS EXAMINATION: Primary | ICD-10-CM

## 2023-05-18 DIAGNOSIS — N27.9 SMALL KIDNEY: ICD-10-CM

## 2023-05-18 DIAGNOSIS — Z85.47 HISTORY OF TESTICULAR CANCER: ICD-10-CM

## 2023-05-18 PROBLEM — K64.1 SECOND DEGREE HEMORRHOIDS: Status: ACTIVE | Noted: 2021-06-04

## 2023-05-18 PROBLEM — D50.9 IRON DEFICIENCY ANEMIA: Status: ACTIVE | Noted: 2023-05-18

## 2023-05-18 PROBLEM — K21.9 GASTROESOPHAGEAL REFLUX DISEASE: Status: ACTIVE | Noted: 2023-05-18

## 2023-05-18 PROBLEM — K63.5 POLYP OF COLON: Status: ACTIVE | Noted: 2023-01-13

## 2023-05-18 PROBLEM — B96.81 HELICOBACTER PYLORI (H. PYLORI) AS THE CAUSE OF DISEASES CLASSIFIED ELSEWHERE: Status: ACTIVE | Noted: 2023-05-18

## 2023-05-18 LAB
APPEARANCE UR: CLEAR
BACTERIA #/AREA URNS HPF: NORMAL /[HPF]
BILIRUB UR QL STRIP: NEGATIVE
COLOR UR: YELLOW
CRYSTALS URNS MICRO: NORMAL
EPI CELLS #/AREA URNS HPF: NORMAL /HPF (ref 0–10)
GLUCOSE UR QL STRIP: NEGATIVE
HGB UR QL STRIP: NEGATIVE
KETONES UR QL STRIP: NEGATIVE
LEUKOCYTE ESTERASE UR QL STRIP: NEGATIVE
MICRO URNS: NORMAL
MICRO URNS: NORMAL
NITRITE UR QL STRIP: NEGATIVE
PH UR STRIP: 6 [PH] (ref 5–7.5)
PROT UR QL STRIP: NEGATIVE
RBC #/AREA URNS HPF: NORMAL /HPF (ref 0–2)
SP GR UR STRIP: 1.02 (ref 1–1.03)
URINALYSIS REFLEX: NORMAL
UROBILINOGEN UR STRIP-MCNC: 1 MG/DL (ref 0.2–1)
WBC #/AREA URNS HPF: NORMAL /HPF (ref 0–5)

## 2023-05-18 PROCEDURE — 1160F RVW MEDS BY RX/DR IN RCRD: CPT | Mod: CPTII,,, | Performed by: INTERNAL MEDICINE

## 2023-05-18 PROCEDURE — 1159F MED LIST DOCD IN RCRD: CPT | Mod: CPTII,,, | Performed by: INTERNAL MEDICINE

## 2023-05-18 PROCEDURE — 3079F PR MOST RECENT DIASTOLIC BLOOD PRESSURE 80-89 MM HG: ICD-10-PCS | Mod: CPTII,,, | Performed by: INTERNAL MEDICINE

## 2023-05-18 PROCEDURE — 3008F BODY MASS INDEX DOCD: CPT | Mod: CPTII,,, | Performed by: INTERNAL MEDICINE

## 2023-05-18 PROCEDURE — 4010F ACE/ARB THERAPY RXD/TAKEN: CPT | Mod: CPTII,,, | Performed by: INTERNAL MEDICINE

## 2023-05-18 PROCEDURE — 3075F PR MOST RECENT SYSTOLIC BLOOD PRESS GE 130-139MM HG: ICD-10-PCS | Mod: CPTII,,, | Performed by: INTERNAL MEDICINE

## 2023-05-18 PROCEDURE — 1159F PR MEDICATION LIST DOCUMENTED IN MEDICAL RECORD: ICD-10-PCS | Mod: CPTII,,, | Performed by: INTERNAL MEDICINE

## 2023-05-18 PROCEDURE — 3075F SYST BP GE 130 - 139MM HG: CPT | Mod: CPTII,,, | Performed by: INTERNAL MEDICINE

## 2023-05-18 PROCEDURE — 3079F DIAST BP 80-89 MM HG: CPT | Mod: CPTII,,, | Performed by: INTERNAL MEDICINE

## 2023-05-18 PROCEDURE — 4010F PR ACE/ARB THEARPY RXD/TAKEN: ICD-10-PCS | Mod: CPTII,,, | Performed by: INTERNAL MEDICINE

## 2023-05-18 PROCEDURE — 99396 PREV VISIT EST AGE 40-64: CPT | Mod: ,,, | Performed by: INTERNAL MEDICINE

## 2023-05-18 PROCEDURE — 1160F PR REVIEW ALL MEDS BY PRESCRIBER/CLIN PHARMACIST DOCUMENTED: ICD-10-PCS | Mod: CPTII,,, | Performed by: INTERNAL MEDICINE

## 2023-05-18 PROCEDURE — 3008F PR BODY MASS INDEX (BMI) DOCUMENTED: ICD-10-PCS | Mod: CPTII,,, | Performed by: INTERNAL MEDICINE

## 2023-05-18 PROCEDURE — 99396 PR PREVENTIVE VISIT,EST,40-64: ICD-10-PCS | Mod: ,,, | Performed by: INTERNAL MEDICINE

## 2023-05-18 RX ORDER — CLINDAMYCIN HYDROCHLORIDE 150 MG/1
150 CAPSULE ORAL EVERY 6 HOURS
Qty: 40 CAPSULE | Refills: 0 | Status: SHIPPED | OUTPATIENT
Start: 2023-05-18 | End: 2023-05-28

## 2023-05-18 RX ORDER — AMLODIPINE BESYLATE 10 MG/1
10 TABLET ORAL DAILY
Qty: 90 TABLET | Refills: 3 | Status: SHIPPED | OUTPATIENT
Start: 2023-05-18 | End: 2024-05-17

## 2023-05-18 RX ORDER — LOSARTAN POTASSIUM 100 MG/1
100 TABLET ORAL DAILY
Qty: 90 TABLET | Refills: 3 | Status: SHIPPED | OUTPATIENT
Start: 2023-05-18 | End: 2024-05-17

## 2023-05-19 LAB
ALBUMIN SERPL-MCNC: 4.5 G/DL (ref 4–5)
ALBUMIN/GLOB SERPL: 1.3 {RATIO} (ref 1.2–2.2)
ALP SERPL-CCNC: 124 IU/L (ref 44–121)
ALT SERPL-CCNC: 12 IU/L (ref 0–44)
AST SERPL-CCNC: 17 IU/L (ref 0–40)
BASOPHILS # BLD AUTO: 0 X10E3/UL (ref 0–0.2)
BASOPHILS NFR BLD AUTO: 1 %
BILIRUB SERPL-MCNC: 0.4 MG/DL (ref 0–1.2)
BUN SERPL-MCNC: 15 MG/DL (ref 6–24)
BUN/CREAT SERPL: 13 (ref 9–20)
CALCIUM SERPL-MCNC: 9.4 MG/DL (ref 8.7–10.2)
CHLORIDE SERPL-SCNC: 101 MMOL/L (ref 96–106)
CHOLEST SERPL-MCNC: 222 MG/DL (ref 100–199)
CO2 SERPL-SCNC: 22 MMOL/L (ref 20–29)
CREAT SERPL-MCNC: 1.15 MG/DL (ref 0.76–1.27)
EOSINOPHIL # BLD AUTO: 0.1 X10E3/UL (ref 0–0.4)
EOSINOPHIL NFR BLD AUTO: 1 %
ERYTHROCYTE [DISTWIDTH] IN BLOOD BY AUTOMATED COUNT: 12.9 % (ref 11.6–15.4)
EST. GFR  (NO RACE VARIABLE): 83 ML/MIN/1.73
GLOBULIN SER CALC-MCNC: 3.4 G/DL (ref 1.5–4.5)
GLUCOSE SERPL-MCNC: 87 MG/DL (ref 70–99)
HBA1C MFR BLD: 5.4 % (ref 4.8–5.6)
HCT VFR BLD AUTO: 40.3 % (ref 37.5–51)
HDLC SERPL-MCNC: 48 MG/DL
HGB BLD-MCNC: 13.6 G/DL (ref 13–17.7)
IMM GRANULOCYTES NFR BLD AUTO: 0 %
LDLC SERPL CALC-MCNC: 159 MG/DL (ref 0–99)
LYMPHOCYTES # BLD AUTO: 1.3 X10E3/UL (ref 0.7–3.1)
LYMPHOCYTES NFR BLD AUTO: 17 %
MCH RBC QN AUTO: 28.3 PG (ref 26.6–33)
MCHC RBC AUTO-ENTMCNC: 33.7 G/DL (ref 31.5–35.7)
MCV RBC AUTO: 84 FL (ref 79–97)
MONOCYTES # BLD AUTO: 0.5 X10E3/UL (ref 0.1–0.9)
MONOCYTES NFR BLD AUTO: 7 %
NEUTROPHILS # BLD AUTO: 5.9 X10E3/UL (ref 1.4–7)
NEUTROPHILS NFR BLD AUTO: 74 %
PLATELET # BLD AUTO: 342 X10E3/UL (ref 150–450)
POTASSIUM SERPL-SCNC: 4.4 MMOL/L (ref 3.5–5.2)
PROT SERPL-MCNC: 7.9 G/DL (ref 6–8.5)
RBC # BLD AUTO: 4.81 X10E6/UL (ref 4.14–5.8)
SODIUM SERPL-SCNC: 138 MMOL/L (ref 134–144)
TRIGL SERPL-MCNC: 82 MG/DL (ref 0–149)
TSH SERPL DL<=0.005 MIU/L-ACNC: 2.51 UIU/ML (ref 0.45–4.5)
VLDLC SERPL CALC-MCNC: 15 MG/DL (ref 5–40)
WBC # BLD AUTO: 7.8 X10E3/UL (ref 3.4–10.8)

## 2023-11-13 ENCOUNTER — TELEPHONE (OUTPATIENT)
Dept: PRIMARY CARE CLINIC | Facility: CLINIC | Age: 38
End: 2023-11-13
Payer: COMMERCIAL

## 2023-11-13 NOTE — TELEPHONE ENCOUNTER
PT CONFIRMED APPT     1. Are there any outstanding tasks in the patient's chart? (Ex. Labs, MMG)?-    2. Do we have any outstanding/Pending referrals?-    3. Has the patient been seen in an ER, Urgent Care or been admitted to the hospital since last office visit with our office?-    4. Has the patient seen any other health care provider (doctors) since last visit?-    5. Has the patient had any blood work or x-rays done since last visit?-    QUALITY-DO NOT ASK PATIENT    -PNEUMONIA  13:-  20:-  23:-    -COLON:1/13/23    -DEXA:-    -DIABETES SCREEN  A1C:5/17/23  MICRO UA:-  EYE EXAM:-  FOOT EXAM:-

## 2024-01-04 ENCOUNTER — OFFICE VISIT (OUTPATIENT)
Dept: PRIMARY CARE CLINIC | Facility: CLINIC | Age: 39
End: 2024-01-04
Payer: COMMERCIAL

## 2024-01-04 VITALS
SYSTOLIC BLOOD PRESSURE: 142 MMHG | HEART RATE: 74 BPM | DIASTOLIC BLOOD PRESSURE: 94 MMHG | OXYGEN SATURATION: 99 % | WEIGHT: 197 LBS | TEMPERATURE: 98 F | BODY MASS INDEX: 29.18 KG/M2 | HEIGHT: 69 IN | RESPIRATION RATE: 16 BRPM

## 2024-01-04 DIAGNOSIS — Z85.47 HISTORY OF TESTICULAR CANCER: ICD-10-CM

## 2024-01-04 DIAGNOSIS — E78.2 MIXED HYPERLIPIDEMIA: ICD-10-CM

## 2024-01-04 DIAGNOSIS — I10 PRIMARY HYPERTENSION: Primary | ICD-10-CM

## 2024-01-04 PROCEDURE — 3077F SYST BP >= 140 MM HG: CPT | Mod: CPTII,,, | Performed by: INTERNAL MEDICINE

## 2024-01-04 PROCEDURE — 36415 COLL VENOUS BLD VENIPUNCTURE: CPT | Mod: ,,, | Performed by: INTERNAL MEDICINE

## 2024-01-04 PROCEDURE — 3080F DIAST BP >= 90 MM HG: CPT | Mod: CPTII,,, | Performed by: INTERNAL MEDICINE

## 2024-01-04 PROCEDURE — 3008F BODY MASS INDEX DOCD: CPT | Mod: CPTII,,, | Performed by: INTERNAL MEDICINE

## 2024-01-04 PROCEDURE — 99214 OFFICE O/P EST MOD 30 MIN: CPT | Mod: ,,, | Performed by: INTERNAL MEDICINE

## 2024-01-04 PROCEDURE — 1160F RVW MEDS BY RX/DR IN RCRD: CPT | Mod: CPTII,,, | Performed by: INTERNAL MEDICINE

## 2024-01-04 PROCEDURE — 1159F MED LIST DOCD IN RCRD: CPT | Mod: CPTII,,, | Performed by: INTERNAL MEDICINE

## 2024-01-04 NOTE — PROGRESS NOTES
Renee Baez MD   [address] [address]     Patient ID: [id number]     Chief Complaint: 6 Months follow up for HTN        HPI:     Robb Carballo is a 38 y.o. male here today for a follow up of HTN. He has eaten gumbo but doesn't think he's been that bad with salt. He's not been checking BP at home. No other complaints today.       Subjective:     Review of Systems   HENT:  Positive for congestion.         Congested today, cleaned up at work and it was really yamile   Respiratory:  Negative for shortness of breath.    Cardiovascular:  Negative for chest pain and palpitations.   Musculoskeletal:  Positive for joint pain.        Knees have been good since fluid was drawn out. ACL tear is not giving him issues. Right hip will flare at times.    Neurological:  Positive for headaches.        Occasionally gets a headache, usually it's with clowning with friends.    Psychiatric/Behavioral:          Hard watching his grandmother decline, they were always close.        Past Medical History:   Diagnosis Date    Alkaline phosphatase elevation     Colitis     COVID-19     Helicobacter pylori gastrointestinal tract infection 06/04/2021    HTN (hypertension)     Hx of testicular cancer 2007    Mixed hyperlipidemia     Obesity, unspecified     Personal history of colonic polyps 01/13/2023    Small kidney         Past Surgical History:   Procedure Laterality Date    ANTERIOR CRUCIATE LIGAMENT REPAIR N/A     COLONOSCOPY W/ BIOPSIES  01/13/2023    Dr Alvarez    COLONOSCOPY W/ BIOPSIES AND POLYPECTOMY      ESOPHAGOGASTRODUODENOSCOPY N/A 06/04/2021    with biopsy    MEDIPORT INSERTION, SINGLE  09/02/2007    ORCHIECTOMY Left 08/14/2007    Dr Wolfe    RETROPERITONEAL MASS EXCISION         Family History   Problem Relation Age of Onset    Hypertension Mother     Stroke Maternal Grandmother     Diabetes Maternal Grandmother     Heart failure Maternal Grandmother     Diabetes Maternal Grandfather     Heart failure Maternal  Grandfather     Stomach cancer Maternal Grandfather     Coronary artery disease Maternal Grandfather     Heart failure Paternal Grandmother     Pancreatic cancer Paternal Grandmother         Social History     Socioeconomic History    Marital status: Unknown   Tobacco Use    Smoking status: Never    Smokeless tobacco: Never   Substance and Sexual Activity    Alcohol use: Not Currently    Drug use: Never     Social Determinants of Health     Financial Resource Strain: Low Risk  (5/18/2023)    Overall Financial Resource Strain (CARDIA)     Difficulty of Paying Living Expenses: Not hard at all   Food Insecurity: No Food Insecurity (5/18/2023)    Hunger Vital Sign     Worried About Running Out of Food in the Last Year: Never true     Ran Out of Food in the Last Year: Never true   Transportation Needs: No Transportation Needs (5/18/2023)    PRAPARE - Transportation     Lack of Transportation (Medical): No     Lack of Transportation (Non-Medical): No   Physical Activity: Sufficiently Active (5/18/2023)    Exercise Vital Sign     Days of Exercise per Week: 5 days     Minutes of Exercise per Session: 40 min   Stress: No Stress Concern Present (5/18/2023)    [employer] - Occupational Stress Questionnaire     Feeling of Stress : Not at all   Social Connections: Unknown (5/18/2023)    Social Connection and Isolation Panel [NHANES]     Frequency of Communication with Friends and Family: Three times a week     Frequency of Social Gatherings with Friends and Family: Twice a week     Attends Gnosticist Services: 1 to 4 times per year     Active Member of Clubs or Organizations: No     Attends Club or Organization Meetings: Never     Marital Status: Patient declined   Housing Stability: Low Risk  (5/18/2023)    Housing Stability Vital Sign     Unable to Pay for Housing in the Last Year: No     Number of Places Lived in the Last Year: 1     Unstable Housing in the Last Year: No       Review of patient's  "allergies indicates:  No Known Allergies    Outpatient Medications Marked as Taking for the 1/4/24 encounter (Office Visit) with Renee Baez MD   Medication Sig Dispense Refill    amLODIPine (NORVASC) 10 MG tablet Take 1 tablet (10 mg total) by mouth once daily. 90 tablet 3    losartan (COZAAR) 100 MG tablet Take 1 tablet (100 mg total) by mouth once daily. 90 tablet 3       Patient Care Team:  Renee Baez MD as PCP - General (Internal Medicine)  Hardy Wolfe MD as Consulting Physician (Urology)  Gonzalo Álvarez MD as Consulting Physician (Orthopedic Surgery)  Yakov Alvarez MD as Consulting Physician (Gastroenterology)       Objective:     BP (!) 142/94   Pulse 74   Temp 98.2 °F (36.8 °C) (Oral)   Resp 16   Ht 5' 9" (1.753 m)   Wt 89.4 kg (197 lb)   SpO2 99%   BMI 29.09 kg/m²     Physical Exam  Vitals reviewed.   HENT:      Head: Normocephalic and atraumatic.   Cardiovascular:      Rate and Rhythm: Normal rate and regular rhythm.   Pulmonary:      Effort: Pulmonary effort is normal.      Breath sounds: Normal breath sounds. No wheezing.   Abdominal:      General: Abdomen is flat.      Tenderness: There is no abdominal tenderness. There is no guarding.   Skin:     General: Skin is warm and dry.   Neurological:      Mental Status: He is alert.   Psychiatric:         Mood and Affect: Mood normal.         Behavior: Behavior normal.         Thought Content: Thought content normal.         Judgment: Judgment normal.               Assessment/Plan:     1. Primary hypertension  Comments:  Elevated today, will start watching at home.. He's maxed out amlodipine and losartan  Orders:  -     Basic Metabolic Panel    2. Mixed hyperlipidemia  Comments:  Due with Wellness    3. History of testicular cancer  Comments:  Due with urology for annual       Consider change to irbesartan or valsartan if BP remains high.      Follow up in about 4 months (around 5/8/2024) for Wellness. In addition to their " scheduled follow up, the patient has also been instructed to follow up on as needed basis.     Signature:  Renee Baez MD  [facility]  [address] [address]

## 2024-01-04 NOTE — PATIENT INSTRUCTIONS
DASH Diet   About this topic   DASH stands for Dietary Approaches to Stop Hypertension. The DASH diet may help you lower blood pressure. It may also help keep you from getting high blood pressure. You will eat less fat and more fiber on the DASH diet.  This diet gives you more minerals that fight high blood pressure. Some nutrients in this diet are:  Potassium ? Acts to help you get rid of salt. This may help to lower blood pressure.  Calcium ? Makes blood vessels and muscles work the right way  Magnesium - Helps blood vessels relax  Fiber ? Helps you feel full. It also helps digestion.  What will the results be?   The DASH diet may help you:  Lower your blood pressure and cholesterol  Lower your risk for cancer, heart disease, heart attack, and stroke. It may also lower your risk for heart failure, kidney stones, and diabetes.  Lose weight or keep a healthy weight  What lifestyle changes are needed?   Add regular exercise to get the most help from this diet.  Try to lower stress. Find ways to relax.  Stop smoking. Avoid secondhand smoke.  Limit alcohol intake.  What changes to diet are needed?   Know about poor eating habits. Then, you can fix them as you work with the program.  This diet encourages fruits and vegetables, whole grains, lean meats, healthy fats, and low-fat or fat-free dairy products.  This diet is lower in saturated fats, trans-fats, cholesterol, added sugars, and sodium.  Who should use this diet?   This eating plan is good for the whole family. It is also good for people with high blood pressure and those at risk for high blood pressure.  What foods are good to eat?   Grains: Try to eat 6 to 8 servings of whole grain, high fiber foods each day. These are bread, cereals, brown rice, or pasta.  Fruits and vegetables: Eat 4 to 5 servings each day. Try to pick many kinds and colors. Fresh or frozen are best. Look for low sodium or salt-free if you choose canned.  Dairy: Try to eat 2 to 3 servings of  fat free and low fat milk products each day.  Lean meats, poultry, and seafood: Try to eat 6 servings or less of lean meats, poultry, and seafood each day. Try to choose more low fat or lean meats like chicken and turkey. Eat less red meat. Eat more fish instead.  Nuts, seeds, and legumes (dry beans and peas): Try to eat 4 to 5 servings each week. Try to pick nuts such as almonds and walnuts, sunflower seeds, peanut butter, soy beans, lentils, kidney beans, and split peas.  Fats and oils: Try to eat 2 to 3 servings of fats and oils each day. Eat good fats found in fish, nuts, and avocados. Try using olive oil or vegetable oils such as canola oil. Other good oils to try are corn, safflower, sunflower, or soybean oils. Use low-sodium and low-fat salad dressing and mayonnaise.  Condiments: Pepper, herbs, spices, vinegar, lemon or lime juices are great for seasoning. Be careful to choose low-sodium or salt-free products if you use broths, soups, or soy sauce.  Sweets: Try to eat less than 5 servings each week. Choose low-fat and trans fat-free desserts. These are things like fruit flavored gelatin, sorbet, jelly beans, perla crackers, animal crackers, low-fat fig bars, and theo snaps. Eat fruit to satisfy your desire for sweets.     What foods should be limited or avoided?   Grains: Salted breads, rolls, crackers, quick breads, self-rising flours, biscuit mixes, regular bread crumbs, instant hot cereals, commercially-prepared rice, pasta, stuffing mixes  Fruits and vegetables: Commercially-prepared potatoes and vegetable mixes, regular canned vegetables and juices, vegetables frozen with sauce or pickled vegetables, processed fruits with salt or sodium  Milk: Whole milk, malted milk, chocolate milk, buttermilk, cheese, ice cream  Meats and beans: Smoked, cured, salted, or canned fish; meats or poultry such as arcos, sausages, sardines; high-fat cuts of meat like beef, lamb, or pork; chicken with the skin on it  Fats:  Cut back on solid fats like butter, lard, and margarine. Eat less food with high saturated fat, cholesterol and total fat.  Condiments and snacks: Salted and canned peas, beans, and olives; salted snack foods; fried foods; soda or other sweetened drinks  Sweets: High-fat baked goods such as muffins, donuts, pastries, commercial baked goods, candy bars  If you choose to drink alcohol, limit the amount you drink. Women should have 1 drink or less per day and men should have 2 drinks or less per day.  Helpful tips   Avoid eating canned vegetables and processed foods. These have a lot of salt in them. Look for a low-salt or low-sodium choice.  Try baking or broiling instead of frying food.  Write down the foods you eat. This will help you track what you have eaten each week.  When you go to a grocery store, have a list or a meal plan. Do not shop when you are hungry to avoid cravings for foods.  Read food labels with care. They will show you how much is in a serving. The amount is given as a percentage of the total amount you need each day. Reading labels will help you make healthy food choices.       Avoid fast foods.  Talk to your doctor or dietitian to see if you need vitamin and mineral supplements to help you balance your diet.  Talk to a dietitian for help.  Where can I learn more?   Academy of Nutrition and Dietetics  https://www.eatright.org/health/wellness/heart-and-cardiovascular-health/dash-diet-reducing-hypertension-through-diet-and-lifestyle   FamilyDoctor.org  http://familydoctor.org/familydoctor/en/prevention-wellness/food-nutrition/weight-loss/the-dash-diet-healthy-eating-to-control-your-blood-pressure.html   Last Reviewed Date   2021-03-15  Consumer Information Use and Disclaimer   This information is not specific medical advice and does not replace information you receive from your health care provider. This is only a brief summary of general information. It does NOT include all information about  conditions, illnesses, injuries, tests, procedures, treatments, therapies, discharge instructions or life-style choices that may apply to you. You must talk with your health care provider for complete information about your health and treatment options. This information should not be used to decide whether or not to accept your health care providers advice, instructions or recommendations. Only your health care provider has the knowledge and training to provide advice that is right for you.  Copyright   Copyright © 2021 [employer] and its affiliates and/or licensors. All rights reserved.

## 2024-01-05 LAB
BUN SERPL-MCNC: 11 MG/DL (ref 6–24)
BUN/CREAT SERPL: 10 (ref 9–20)
CALCIUM SERPL-MCNC: 9.4 MG/DL (ref 8.7–10.2)
CHLORIDE SERPL-SCNC: 97 MMOL/L (ref 96–106)
CO2 SERPL-SCNC: 24 MMOL/L (ref 20–29)
CREAT SERPL-MCNC: 1.15 MG/DL (ref 0.76–1.27)
EST. GFR  (NO RACE VARIABLE): 83 ML/MIN/1.73
GLUCOSE SERPL-MCNC: 91 MG/DL (ref 70–99)
POTASSIUM SERPL-SCNC: 3.9 MMOL/L (ref 3.5–5.2)
SODIUM SERPL-SCNC: 137 MMOL/L (ref 134–144)

## 2024-01-08 ENCOUNTER — TELEPHONE (OUTPATIENT)
Dept: PRIMARY CARE CLINIC | Facility: CLINIC | Age: 39
End: 2024-01-08
Payer: COMMERCIAL

## 2024-01-08 NOTE — TELEPHONE ENCOUNTER
----- Message from Renee Baez MD sent at 1/8/2024  8:47 AM CST -----  Kidney function and electrolytes are stable. Keep hydrated!

## 2024-05-07 ENCOUNTER — TELEPHONE (OUTPATIENT)
Dept: PRIMARY CARE CLINIC | Facility: CLINIC | Age: 39
End: 2024-05-07
Payer: COMMERCIAL

## 2024-05-07 NOTE — PROGRESS NOTES
Renee Baez MD   [id number] [place] [address]     Patient ID: [id number]     Chief Complaint: Wellness exam        HPI:     Robb Carballo is a 39 y.o. male here today for an annual Wellness. No recent doctor visits. He's going to have to call his urologist to schedule. No other complaints today.       Subjective:     Review of Systems   Constitutional: Negative.    HENT: Negative.     Eyes:  Positive for blurred vision.        He knows he needs to go   Respiratory: Negative.     Cardiovascular: Negative.    Gastrointestinal:         Bloating a lot, he's working out of town and just got back. His bottom is staying healed, he has been wiping some but it seems like he's sweating   Genitourinary:         Probably due with urology   Musculoskeletal:         R knee is still there but it's the best it's been for years.    Skin: Negative.    Neurological: Negative.    Endo/Heme/Allergies: Negative.    Psychiatric/Behavioral: Negative.          He does worry       Past Medical History:   Diagnosis Date    Alkaline phosphatase elevation     Colitis     COVID-19     Helicobacter pylori gastrointestinal tract infection 06/04/2021    HTN (hypertension)     Hx of testicular cancer 2007    Mixed hyperlipidemia     Obesity, unspecified     Personal history of colonic polyps 01/13/2023    Small kidney         Past Surgical History:   Procedure Laterality Date    ANTERIOR CRUCIATE LIGAMENT REPAIR N/A     COLONOSCOPY W/ BIOPSIES  01/13/2023    Dr Alvarez    COLONOSCOPY W/ BIOPSIES AND POLYPECTOMY      ESOPHAGOGASTRODUODENOSCOPY N/A 06/04/2021    with biopsy    MEDIPORT INSERTION, SINGLE  09/02/2007    ORCHIECTOMY Left 08/14/2007    Dr Wolfe    RETROPERITONEAL MASS EXCISION         Family History   Problem Relation Name Age of Onset    Hypertension Mother      Stroke Maternal Grandmother      Diabetes Maternal Grandmother      Heart failure Maternal Grandmother      Diabetes Maternal Grandfather      Heart failure Maternal  Grandfather      Stomach cancer Maternal Grandfather      Coronary artery disease Maternal Grandfather      Heart failure Paternal Grandmother      Pancreatic cancer Paternal Grandmother          Social History     Socioeconomic History    Marital status: Unknown   Tobacco Use    Smoking status: Never    Smokeless tobacco: Never   Substance and Sexual Activity    Alcohol use: Not Currently    Drug use: Never     Social Determinants of Health     Financial Resource Strain: Low Risk  (5/8/2024)    Overall Financial Resource Strain (CARDIA)     Difficulty of Paying Living Expenses: Not hard at all   Food Insecurity: No Food Insecurity (5/8/2024)    Hunger Vital Sign     Worried About Running Out of Food in the Last Year: Never true     Ran Out of Food in the Last Year: Never true   Transportation Needs: No Transportation Needs (5/8/2024)    TRANSPORTATION NEEDS     Transportation : No   Physical Activity: Inactive (5/8/2024)    Exercise Vital Sign     Days of Exercise per Week: 0 days     Minutes of Exercise per Session: 0 min   Stress: No Stress Concern Present (5/8/2024)    [employer] - Occupational Stress Questionnaire     Feeling of Stress : Not at all   Housing Stability: Low Risk  (5/8/2024)    Housing Stability Vital Sign     Unable to Pay for Housing in the Last Year: No     Homeless in the Last Year: No       Review of patient's allergies indicates:  No Known Allergies    Outpatient Medications Marked as Taking for the 5/8/24 encounter (Office Visit) with Renee Baez MD   Medication Sig Dispense Refill    [DISCONTINUED] amLODIPine (NORVASC) 10 MG tablet Take 1 tablet (10 mg total) by mouth once daily. 90 tablet 3    [DISCONTINUED] losartan (COZAAR) 100 MG tablet Take 1 tablet (100 mg total) by mouth once daily. 90 tablet 3       Patient Care Team:  Renee Baez MD as PCP - General (Internal Medicine)  Hardy Wolfe MD as Consulting Physician (Urology)  Gonzalo Álvarez  "MD CHARLIE as Consulting Physician (Orthopedic Surgery)  Yakov Alvarez MD as Consulting Physician (Gastroenterology)       Objective:     /88   Pulse 75   Temp 98.1 °F (36.7 °C) (Oral)   Resp 16   Ht 5' 9" (1.753 m)   Wt 93.1 kg (205 lb 3.2 oz)   SpO2 97%   BMI 30.30 kg/m²     Physical Exam  Vitals reviewed.   Constitutional:       Appearance: Normal appearance.   Cardiovascular:      Rate and Rhythm: Normal rate and regular rhythm.   Pulmonary:      Effort: Pulmonary effort is normal.      Breath sounds: Normal breath sounds.   Abdominal:      General: Bowel sounds are normal.      Palpations: Abdomen is soft.      Tenderness: There is no abdominal tenderness. There is no guarding.   Skin:     General: Skin is warm and dry.   Neurological:      Mental Status: He is alert.               Assessment/Plan:     1. Wellness examination  Comments:  Has been out of town and off diet, he did fast today for lab.  Orders:  -     CBC Auto Differential  -     Comprehensive Metabolic Panel  -     Lipid Panel  -     Urinalysis  -     Hepatitis C Antibody    2. Primary hypertension  Comments:  At goal, refill losartan and amlodipine  Orders:  -     Comprehensive Metabolic Panel  -     Lipid Panel  -     Urinalysis    3. Mixed hyperlipidemia  Comments:  Off diet but with LDL rising this may be the year for a statin  Orders:  -     Lipid Panel    4. History of testicular cancer  Comments:  He's scheduling his follow up  Orders:  -     CBC Auto Differential    5. Iron deficiency anemia due to chronic blood loss  Comments:  Last level was good, will recheck CBC  Orders:  -     CBC Auto Differential    6. Encounter for hepatitis C screening test for low risk patient  -     Hepatitis C Antibody    Other orders  -     losartan (COZAAR) 100 MG tablet; Take 1 tablet (100 mg total) by mouth once daily.  Dispense: 90 tablet; Refill: 3  -     amLODIPine (NORVASC) 10 MG tablet; Take 1 tablet (10 mg total) by mouth once daily.  " Dispense: 90 tablet; Refill: 3             Follow up in about 6 months (around 11/8/2024) for HTN. In addition to their scheduled follow up, the patient has also been instructed to follow up on as needed basis.     Signature:  Renee Baez MD  [facility]  [address] [place] [address]

## 2024-05-08 ENCOUNTER — OFFICE VISIT (OUTPATIENT)
Dept: PRIMARY CARE CLINIC | Facility: CLINIC | Age: 39
End: 2024-05-08
Payer: COMMERCIAL

## 2024-05-08 VITALS
HEART RATE: 75 BPM | TEMPERATURE: 98 F | WEIGHT: 205.19 LBS | SYSTOLIC BLOOD PRESSURE: 129 MMHG | BODY MASS INDEX: 30.39 KG/M2 | OXYGEN SATURATION: 97 % | HEIGHT: 69 IN | DIASTOLIC BLOOD PRESSURE: 88 MMHG | RESPIRATION RATE: 16 BRPM

## 2024-05-08 DIAGNOSIS — D50.0 IRON DEFICIENCY ANEMIA DUE TO CHRONIC BLOOD LOSS: ICD-10-CM

## 2024-05-08 DIAGNOSIS — Z11.59 ENCOUNTER FOR HEPATITIS C SCREENING TEST FOR LOW RISK PATIENT: ICD-10-CM

## 2024-05-08 DIAGNOSIS — Z00.00 WELLNESS EXAMINATION: Primary | ICD-10-CM

## 2024-05-08 DIAGNOSIS — E78.2 MIXED HYPERLIPIDEMIA: ICD-10-CM

## 2024-05-08 DIAGNOSIS — I10 PRIMARY HYPERTENSION: ICD-10-CM

## 2024-05-08 DIAGNOSIS — Z85.47 HISTORY OF TESTICULAR CANCER: ICD-10-CM

## 2024-05-08 LAB
ALBUMIN SERPL-MCNC: 4.2 G/DL (ref 3.5–5)
ALBUMIN/GLOB SERPL: 1.3 RATIO (ref 1.1–2)
ALP SERPL-CCNC: 95 UNIT/L (ref 40–150)
ALT SERPL-CCNC: 24 UNIT/L (ref 0–55)
AST SERPL-CCNC: 22 UNIT/L (ref 5–34)
BACTERIA #/AREA URNS AUTO: NORMAL /HPF
BASOPHILS # BLD AUTO: 0.02 X10(3)/MCL
BASOPHILS NFR BLD AUTO: 0.3 %
BILIRUB SERPL-MCNC: 0.4 MG/DL
BILIRUB UR QL STRIP.AUTO: NEGATIVE
BUN SERPL-MCNC: 13.9 MG/DL (ref 8.9–20.6)
CALCIUM SERPL-MCNC: 9.5 MG/DL (ref 8.4–10.2)
CHLORIDE SERPL-SCNC: 104 MMOL/L (ref 98–107)
CHOLEST SERPL-MCNC: 239 MG/DL
CHOLEST/HDLC SERPL: 5 {RATIO} (ref 0–5)
CLARITY UR: CLEAR
CO2 SERPL-SCNC: 25 MMOL/L (ref 22–29)
COLOR UR AUTO: YELLOW
CREAT SERPL-MCNC: 1.05 MG/DL (ref 0.73–1.18)
EOSINOPHIL # BLD AUTO: 0.04 X10(3)/MCL (ref 0–0.9)
EOSINOPHIL NFR BLD AUTO: 0.5 %
ERYTHROCYTE [DISTWIDTH] IN BLOOD BY AUTOMATED COUNT: 12.5 % (ref 11.5–17)
GFR SERPLBLD CREATININE-BSD FMLA CKD-EPI: >60 ML/MIN/1.73/M2
GLOBULIN SER-MCNC: 3.3 GM/DL (ref 2.4–3.5)
GLUCOSE SERPL-MCNC: 84 MG/DL (ref 74–100)
GLUCOSE UR QL STRIP: NEGATIVE
HCT VFR BLD AUTO: 39.2 % (ref 42–52)
HDLC SERPL-MCNC: 50 MG/DL (ref 35–60)
HGB BLD-MCNC: 13.6 G/DL (ref 14–18)
HGB UR QL STRIP: NEGATIVE
IMM GRANULOCYTES # BLD AUTO: 0.01 X10(3)/MCL (ref 0–0.04)
IMM GRANULOCYTES NFR BLD AUTO: 0.1 %
KETONES UR QL STRIP: NEGATIVE
LDLC SERPL CALC-MCNC: 173 MG/DL (ref 50–140)
LEUKOCYTE ESTERASE UR QL STRIP: NEGATIVE
LYMPHOCYTES # BLD AUTO: 1.7 X10(3)/MCL (ref 0.6–4.6)
LYMPHOCYTES NFR BLD AUTO: 22.5 %
MCH RBC QN AUTO: 28.9 PG (ref 27–31)
MCHC RBC AUTO-ENTMCNC: 34.7 G/DL (ref 33–36)
MCV RBC AUTO: 83.2 FL (ref 80–94)
MONOCYTES # BLD AUTO: 0.43 X10(3)/MCL (ref 0.1–1.3)
MONOCYTES NFR BLD AUTO: 5.7 %
NEUTROPHILS # BLD AUTO: 5.34 X10(3)/MCL (ref 2.1–9.2)
NEUTROPHILS NFR BLD AUTO: 70.9 %
NITRITE UR QL STRIP: NEGATIVE
PH UR STRIP: 5.5 [PH]
PLATELET # BLD AUTO: 322 X10(3)/MCL (ref 130–400)
PMV BLD AUTO: 10.6 FL (ref 7.4–10.4)
POTASSIUM SERPL-SCNC: 4.2 MMOL/L (ref 3.5–5.1)
PROT SERPL-MCNC: 7.5 GM/DL (ref 6.4–8.3)
PROT UR QL STRIP: NEGATIVE
RBC # BLD AUTO: 4.71 X10(6)/MCL (ref 4.7–6.1)
RBC #/AREA URNS AUTO: NORMAL /HPF
SODIUM SERPL-SCNC: 137 MMOL/L (ref 136–145)
SP GR UR STRIP.AUTO: 1.01 (ref 1–1.03)
SQUAMOUS #/AREA URNS AUTO: NORMAL /HPF
TRIGL SERPL-MCNC: 82 MG/DL (ref 34–140)
UROBILINOGEN UR STRIP-ACNC: 0.2
VLDLC SERPL CALC-MCNC: 16 MG/DL
WBC # SPEC AUTO: 7.54 X10(3)/MCL (ref 4.5–11.5)
WBC #/AREA URNS AUTO: NORMAL /HPF

## 2024-05-08 PROCEDURE — 3008F BODY MASS INDEX DOCD: CPT | Mod: CPTII,,, | Performed by: INTERNAL MEDICINE

## 2024-05-08 PROCEDURE — 99396 PREV VISIT EST AGE 40-64: CPT | Mod: ,,, | Performed by: INTERNAL MEDICINE

## 2024-05-08 PROCEDURE — 86803 HEPATITIS C AB TEST: CPT | Performed by: INTERNAL MEDICINE

## 2024-05-08 PROCEDURE — 1160F RVW MEDS BY RX/DR IN RCRD: CPT | Mod: CPTII,,, | Performed by: INTERNAL MEDICINE

## 2024-05-08 PROCEDURE — 4010F ACE/ARB THERAPY RXD/TAKEN: CPT | Mod: CPTII,,, | Performed by: INTERNAL MEDICINE

## 2024-05-08 PROCEDURE — 80061 LIPID PANEL: CPT | Performed by: INTERNAL MEDICINE

## 2024-05-08 PROCEDURE — 1159F MED LIST DOCD IN RCRD: CPT | Mod: CPTII,,, | Performed by: INTERNAL MEDICINE

## 2024-05-08 PROCEDURE — 3074F SYST BP LT 130 MM HG: CPT | Mod: CPTII,,, | Performed by: INTERNAL MEDICINE

## 2024-05-08 PROCEDURE — 3079F DIAST BP 80-89 MM HG: CPT | Mod: CPTII,,, | Performed by: INTERNAL MEDICINE

## 2024-05-08 PROCEDURE — 85025 COMPLETE CBC W/AUTO DIFF WBC: CPT | Performed by: INTERNAL MEDICINE

## 2024-05-08 PROCEDURE — 80053 COMPREHEN METABOLIC PANEL: CPT | Performed by: INTERNAL MEDICINE

## 2024-05-08 PROCEDURE — 36415 COLL VENOUS BLD VENIPUNCTURE: CPT | Performed by: INTERNAL MEDICINE

## 2024-05-08 PROCEDURE — 81003 URINALYSIS AUTO W/O SCOPE: CPT | Performed by: INTERNAL MEDICINE

## 2024-05-08 RX ORDER — AMLODIPINE BESYLATE 10 MG/1
10 TABLET ORAL DAILY
Qty: 90 TABLET | Refills: 3 | Status: SHIPPED | OUTPATIENT
Start: 2024-05-08 | End: 2025-05-08

## 2024-05-08 RX ORDER — LOSARTAN POTASSIUM 100 MG/1
100 TABLET ORAL DAILY
Qty: 90 TABLET | Refills: 3 | Status: SHIPPED | OUTPATIENT
Start: 2024-05-08 | End: 2025-05-08

## 2024-05-08 NOTE — PATIENT INSTRUCTIONS
Mickey Zamudio,     If you are due for any health screening(s) below please notify me so we can arrange them to be ordered and scheduled. Most healthy patients at your age complete them, but you are free to accept or refuse.     If you can't do it, I'll definitely understand. If you can, I'd certainly appreciate it!    Tests to Keep You Healthy    Last Blood Pressure <= 139/89 (5/8/2024): NO Yes repeat 129/88    Consider pneumonia, COVID vaccines

## 2024-05-09 LAB — HCV AB SERPL QL IA: NONREACTIVE

## 2024-05-13 ENCOUNTER — TELEPHONE (OUTPATIENT)
Dept: PRIMARY CARE CLINIC | Facility: CLINIC | Age: 39
End: 2024-05-13
Payer: COMMERCIAL

## 2024-05-13 DIAGNOSIS — E78.00 HYPERCHOLESTEREMIA: Primary | ICD-10-CM

## 2024-05-13 DIAGNOSIS — Z79.899 ON STATIN THERAPY: ICD-10-CM

## 2024-05-13 RX ORDER — ROSUVASTATIN CALCIUM 5 MG/1
5 TABLET, COATED ORAL DAILY
Qty: 30 TABLET | Refills: 3 | Status: SHIPPED | OUTPATIENT
Start: 2024-05-13

## 2024-05-13 NOTE — TELEPHONE ENCOUNTER
----- Message from Renee Baez MD sent at 5/9/2024  6:02 PM CDT -----  Lab looks pretty good but the cholesterol did go up, he was out of town but the bad is really high so I do think it's time to add a statin to protect from heart attacks, strokes or blockages in the leg. His anemia is stable. Urine looks clear, liver is ok. Copy to Alvarez and Shuffler

## 2024-05-13 NOTE — TELEPHONE ENCOUNTER
I sent Crestor to [facility] and put in lab to check liver and muscle in a month. If he feels achy on it then CoQ 10 over the counter often helps.

## 2024-06-10 ENCOUNTER — OFFICE VISIT (OUTPATIENT)
Dept: URGENT CARE | Facility: CLINIC | Age: 39
End: 2024-06-10
Payer: COMMERCIAL

## 2024-06-10 VITALS
BODY MASS INDEX: 30.27 KG/M2 | TEMPERATURE: 102 F | WEIGHT: 205 LBS | HEART RATE: 110 BPM | SYSTOLIC BLOOD PRESSURE: 134 MMHG | DIASTOLIC BLOOD PRESSURE: 85 MMHG | OXYGEN SATURATION: 98 % | RESPIRATION RATE: 18 BRPM

## 2024-06-10 DIAGNOSIS — R50.9 FEVER, UNSPECIFIED FEVER CAUSE: Primary | ICD-10-CM

## 2024-06-10 DIAGNOSIS — R51.9 ACUTE NONINTRACTABLE HEADACHE, UNSPECIFIED HEADACHE TYPE: ICD-10-CM

## 2024-06-10 PROCEDURE — 99213 OFFICE O/P EST LOW 20 MIN: CPT | Mod: 25,,,

## 2024-06-10 PROCEDURE — 87635 SARS-COV-2 COVID-19 AMP PRB: CPT | Mod: QW,,,

## 2024-06-10 PROCEDURE — 87502 INFLUENZA DNA AMP PROBE: CPT | Mod: QW,,,

## 2024-06-10 PROCEDURE — 96372 THER/PROPH/DIAG INJ SC/IM: CPT | Mod: ,,,

## 2024-06-10 RX ORDER — KETOROLAC TROMETHAMINE 30 MG/ML
60 INJECTION, SOLUTION INTRAMUSCULAR; INTRAVENOUS ONCE
Status: COMPLETED | OUTPATIENT
Start: 2024-06-10 | End: 2024-06-10

## 2024-06-10 RX ORDER — ACETAMINOPHEN 500 MG
1000 TABLET ORAL
Status: COMPLETED | OUTPATIENT
Start: 2024-06-10 | End: 2024-06-10

## 2024-06-10 RX ADMIN — KETOROLAC TROMETHAMINE 60 MG: 30 INJECTION, SOLUTION INTRAMUSCULAR; INTRAVENOUS at 12:06

## 2024-06-10 RX ADMIN — Medication 1000 MG: at 12:06

## 2024-06-10 NOTE — PATIENT INSTRUCTIONS
Flu and COVID negative.  It may have been too early to test as symptoms just started last night or this may be a different type of viral illness.  May return for further assessment if you begin to have other symptoms.     Tylenol every 4 hours and or Motrin every 6 hours for fevers and body aches.     Rest and drink plenty of fluids.     Recommend staying home from work until you are fever free for 24 hours.    Follow up with primary care in 5-6 days if not better.     Go directly to emergency room if you begin to have shortness of breath, chest pain, or other worrisome symptoms.

## 2024-06-10 NOTE — PROGRESS NOTES
Subjective:      Patient ID: Robb Carballo is a 39 y.o. male.    Vitals:  weight is 93 kg (205 lb). His tympanic temperature is 102 °F (38.9 °C) (abnormal). His blood pressure is 134/85 and his pulse is 110. His respiration is 18 and oxygen saturation is 98%.     Chief Complaint: Headache     Patient is a 39 y.o. male who presents to urgent care with complaints of headache and body aches x 1 day, denies any other symptoms. Alleviating factors include nothing with no relief. Patient denies any cough, sore throat, congestion, SOB, CP, rash, n/v/d, or neck stiffness.      Headache   Associated symptoms include a fever.     Constitution: Positive for chills and fever.   Neurological:  Positive for headaches.      Objective:     Physical Exam   Constitutional: He is oriented to person, place, and time.  Non-toxic appearance. He does not appear ill.   HENT:   Ears:   Right Ear: Tympanic membrane, external ear and ear canal normal.   Left Ear: Tympanic membrane, external ear and ear canal normal.   Nose: Nose normal.   Mouth/Throat: Mucous membranes are moist. No posterior oropharyngeal erythema. Oropharynx is clear.   Eyes: Conjunctivae are normal.   Neck: Neck supple. No neck rigidity present.   Cardiovascular: Normal rate and normal pulses.   Pulmonary/Chest: Effort normal and breath sounds normal.   Abdominal: Soft. There is no abdominal tenderness.   Neurological: He is alert and oriented to person, place, and time.   Skin: Skin is warm, not diaphoretic and no rash.   Psychiatric: His behavior is normal. Mood normal.   Nursing note and vitals reviewed.      Assessment:     1. Fever, unspecified fever cause           Previous History      Review of patient's allergies indicates:  No Known Allergies    Past Medical History:   Diagnosis Date    Alkaline phosphatase elevation     Colitis     COVID-19     Helicobacter pylori gastrointestinal tract infection 06/04/2021    HTN (hypertension)     Hx of testicular cancer  2007    Mixed hyperlipidemia     Obesity, unspecified     Personal history of colonic polyps 01/13/2023    Small kidney      Current Outpatient Medications   Medication Instructions    amLODIPine (NORVASC) 10 mg, Oral, Daily    losartan (COZAAR) 100 mg, Oral, Daily    rosuvastatin (CRESTOR) 5 mg, Oral, Daily     Past Surgical History:   Procedure Laterality Date    ANTERIOR CRUCIATE LIGAMENT REPAIR N/A     COLONOSCOPY W/ BIOPSIES  01/13/2023    Dr Alvarez    COLONOSCOPY W/ BIOPSIES AND POLYPECTOMY      ESOPHAGOGASTRODUODENOSCOPY N/A 06/04/2021    with biopsy    MEDIPORT INSERTION, SINGLE  09/02/2007    ORCHIECTOMY Left 08/14/2007    Dr Wolfe    RETROPERITONEAL MASS EXCISION       Family History   Problem Relation Name Age of Onset    Hypertension Mother      Hypertension Father      Stroke Father      Stroke Maternal Grandmother      Diabetes Maternal Grandmother      Heart failure Maternal Grandmother      Diabetes Maternal Grandfather      Heart failure Maternal Grandfather      Stomach cancer Maternal Grandfather      Coronary artery disease Maternal Grandfather      Heart failure Paternal Grandmother      Pancreatic cancer Paternal Grandmother      Diabetes type II Paternal Aunt Renee     Hypertension Paternal Aunt Renee     Coronary artery disease Paternal Aunt Renee        Social History     Tobacco Use    Smoking status: Never    Smokeless tobacco: Never   Substance Use Topics    Alcohol use: Not Currently    Drug use: Never        Physical Exam      Vital Signs Reviewed   /85   Pulse 110   Temp (!) 102 °F (38.9 °C) (Tympanic)   Resp 18   Wt 93 kg (205 lb)   SpO2 98%   BMI 30.27 kg/m²        Procedures    Procedures     Labs     Results for orders placed or performed in visit on 06/10/24   POCT Influenza A/B MOLECULAR   Result Value Ref Range    POC Molecular Influenza A Ag Negative Negative    POC Molecular Influenza B Ag Negative Negative     Acceptable Yes    POCT  COVID-19 Rapid Screening   Result Value Ref Range    POC Rapid COVID Negative Negative     Acceptable Yes       Plan:       Fever, unspecified fever cause  -     acetaminophen tablet 1,000 mg  -     POCT Influenza A/B MOLECULAR  -     POCT COVID-19 Rapid Screening       Flu and COVID negative.  It may have been too early to test as symptoms just started last night or this may be a different type of viral illness.  May return for further assessment if you begin to have other symptoms.     Tylenol every 4 hours and or Motrin every 6 hours for fevers and body aches.     Rest and drink plenty of fluids.     Follow up with primary care in 5-6 days if not better.     Go directly to emergency room if you begin to have shortness of breath, chest pain, or other worrisome symptoms.

## 2024-06-11 ENCOUNTER — TELEPHONE (OUTPATIENT)
Dept: PRIMARY CARE CLINIC | Facility: CLINIC | Age: 39
End: 2024-06-11
Payer: COMMERCIAL

## 2024-06-11 NOTE — TELEPHONE ENCOUNTER
Spoke to patient he is still running a fever less body ache tested negative for Covid and flu He recheck at home for Covid still negative.States no labs was done he is due to do LFT for the statin this month.

## 2024-09-08 RX ORDER — ROSUVASTATIN CALCIUM 5 MG/1
5 TABLET, COATED ORAL
Qty: 30 TABLET | Refills: 0 | Status: SHIPPED | OUTPATIENT
Start: 2024-09-08

## 2024-10-20 RX ORDER — ROSUVASTATIN CALCIUM 5 MG/1
5 TABLET, COATED ORAL
Qty: 30 TABLET | Refills: 4 | Status: SHIPPED | OUTPATIENT
Start: 2024-10-20

## 2024-11-06 ENCOUNTER — OFFICE VISIT (OUTPATIENT)
Dept: PRIMARY CARE CLINIC | Facility: CLINIC | Age: 39
End: 2024-11-06
Payer: COMMERCIAL

## 2024-11-06 VITALS
DIASTOLIC BLOOD PRESSURE: 87 MMHG | BODY MASS INDEX: 30.51 KG/M2 | WEIGHT: 206 LBS | TEMPERATURE: 97 F | RESPIRATION RATE: 15 BRPM | SYSTOLIC BLOOD PRESSURE: 134 MMHG | HEART RATE: 73 BPM | HEIGHT: 69 IN | OXYGEN SATURATION: 98 %

## 2024-11-06 DIAGNOSIS — I10 PRIMARY HYPERTENSION: Primary | ICD-10-CM

## 2024-11-06 DIAGNOSIS — E78.00 HYPERCHOLESTEREMIA: ICD-10-CM

## 2024-11-06 DIAGNOSIS — Z79.899 ON STATIN THERAPY: ICD-10-CM

## 2024-11-06 PROCEDURE — 99214 OFFICE O/P EST MOD 30 MIN: CPT | Mod: ,,, | Performed by: INTERNAL MEDICINE

## 2024-11-06 PROCEDURE — 3075F SYST BP GE 130 - 139MM HG: CPT | Mod: CPTII,,, | Performed by: INTERNAL MEDICINE

## 2024-11-06 PROCEDURE — 1159F MED LIST DOCD IN RCRD: CPT | Mod: CPTII,,, | Performed by: INTERNAL MEDICINE

## 2024-11-06 PROCEDURE — 3079F DIAST BP 80-89 MM HG: CPT | Mod: CPTII,,, | Performed by: INTERNAL MEDICINE

## 2024-11-06 PROCEDURE — 4010F ACE/ARB THERAPY RXD/TAKEN: CPT | Mod: CPTII,,, | Performed by: INTERNAL MEDICINE

## 2024-11-06 PROCEDURE — 3008F BODY MASS INDEX DOCD: CPT | Mod: CPTII,,, | Performed by: INTERNAL MEDICINE

## 2024-11-06 PROCEDURE — 1160F RVW MEDS BY RX/DR IN RCRD: CPT | Mod: CPTII,,, | Performed by: INTERNAL MEDICINE

## 2024-11-06 NOTE — PROGRESS NOTES
Renee Baez MD   [address] [place] [address]     Patient ID: [id number]     Chief Complaint: 6 Months follow up for HTN        HPI:     Robb Carballo is a 39 y.o. male here today for a follow up of HTN. He is tolerating the statin. He has not had any kind of side effects. No other complaints today.       Subjective:     Review of Systems   Constitutional:  Positive for malaise/fatigue.        Tired but he shot pool last night till 12PM, He did stay up late on vacation in [place]   Respiratory:          Sometimes feels a little cold around the upper chest, it went away and hasn't come in awhile.    Cardiovascular: Negative.    Gastrointestinal: Negative.    Genitourinary:         He may have missed his visit, he's not sure   Musculoskeletal:         Joints are doing well   Psychiatric/Behavioral:          Stress is good       Past Medical History:   Diagnosis Date    Alkaline phosphatase elevation     Colitis     COVID-19     Helicobacter pylori gastrointestinal tract infection 06/04/2021    HTN (hypertension)     Hx of testicular cancer 2007    Mixed hyperlipidemia     Obesity, unspecified     Personal history of colonic polyps 01/13/2023    Small kidney         Past Surgical History:   Procedure Laterality Date    ANTERIOR CRUCIATE LIGAMENT REPAIR N/A     COLONOSCOPY W/ BIOPSIES  01/13/2023    Dr Alvarez    COLONOSCOPY W/ BIOPSIES AND POLYPECTOMY      ESOPHAGOGASTRODUODENOSCOPY N/A 06/04/2021    with biopsy    MEDIPORT INSERTION, SINGLE  09/02/2007    ORCHIECTOMY Left 08/14/2007    Dr Wolfe    RETROPERITONEAL MASS EXCISION         Family History   Problem Relation Name Age of Onset    Hypertension Mother      Hypertension Father      Stroke Father      Stroke Maternal Grandmother      Diabetes Maternal Grandmother      Heart failure Maternal Grandmother      Diabetes Maternal Grandfather      Heart failure Maternal Grandfather      Stomach cancer Maternal Grandfather      Coronary artery disease Maternal  Grandfather      Heart failure Paternal Grandmother      Pancreatic cancer Paternal Grandmother      Diabetes type II Paternal Aunt Renee     Hypertension Paternal Aunt Renee     Coronary artery disease Paternal Aunt Renee         Social History     Socioeconomic History    Marital status: Unknown   Tobacco Use    Smoking status: Never    Smokeless tobacco: Never   Substance and Sexual Activity    Alcohol use: Not Currently    Drug use: Never     Social Drivers of Health     Financial Resource Strain: Low Risk  (5/8/2024)    Overall Financial Resource Strain (CARDIA)     Difficulty of Paying Living Expenses: Not hard at all   Food Insecurity: No Food Insecurity (5/8/2024)    Hunger Vital Sign     Worried About Running Out of Food in the Last Year: Never true     Ran Out of Food in the Last Year: Never true   Transportation Needs: No Transportation Needs (5/8/2024)    TRANSPORTATION NEEDS     Transportation : No   Physical Activity: Inactive (5/8/2024)    Exercise Vital Sign     Days of Exercise per Week: 0 days     Minutes of Exercise per Session: 0 min   Stress: No Stress Concern Present (5/8/2024)    [employer] - Occupational Stress Questionnaire     Feeling of Stress : Not at all   Housing Stability: Low Risk  (5/8/2024)    Housing Stability Vital Sign     Unable to Pay for Housing in the Last Year: No     Homeless in the Last Year: No       Review of patient's allergies indicates:  No Known Allergies    Outpatient Medications Marked as Taking for the 11/6/24 encounter (Office Visit) with Renee Baez MD   Medication Sig Dispense Refill    amLODIPine (NORVASC) 10 MG tablet Take 1 tablet (10 mg total) by mouth once daily. 90 tablet 3    losartan (COZAAR) 100 MG tablet Take 1 tablet (100 mg total) by mouth once daily. 90 tablet 3    rosuvastatin (CRESTOR) 5 MG tablet Take 1 tablet by mouth once daily 30 tablet 4       Patient Care Team:  Renee Baez MD as PCP -  "General (Internal Medicine)  Hardy Wolfe MD as Consulting Physician (Urology)  Gonzalo Álvarez MD as Consulting Physician (Orthopedic Surgery)  Yakov Alvarez MD as Consulting Physician (Gastroenterology)       Objective:     /87   Pulse 73   Temp 97 °F (36.1 °C) (Oral)   Resp 15   Ht 5' 9" (1.753 m)   Wt 93.4 kg (206 lb)   SpO2 98%   BMI 30.42 kg/m²     Physical Exam  Vitals reviewed.   Cardiovascular:      Rate and Rhythm: Normal rate and regular rhythm.   Pulmonary:      Effort: Pulmonary effort is normal.      Breath sounds: Normal breath sounds.   Abdominal:      General: Bowel sounds are normal.      Palpations: Abdomen is soft.   Musculoskeletal:         General: No swelling or tenderness.   Skin:     General: Skin is warm and dry.   Neurological:      Mental Status: He is alert.               Assessment/Plan:     1. Primary hypertension  Comments:  Good control, doesn't need refills yet.    2. Hypercholesteremia  Comments:  Continue Crestor, just had refills in Oct    3. On statin therapy       Will send me paper for insurance to show he's doing his annuals      Follow up in about 6 months (around 5/6/2025) for Wellness. In addition to their scheduled follow up, the patient has also been instructed to follow up on as needed basis.     Signature:  Renee Baez MD  [facility]  [address] [place] [address]    "

## 2025-03-19 RX ORDER — ROSUVASTATIN CALCIUM 5 MG/1
5 TABLET, COATED ORAL
Qty: 90 TABLET | Refills: 0 | Status: SHIPPED | OUTPATIENT
Start: 2025-03-19

## 2025-05-07 ENCOUNTER — OFFICE VISIT (OUTPATIENT)
Dept: PRIMARY CARE CLINIC | Facility: CLINIC | Age: 40
End: 2025-05-07
Payer: COMMERCIAL

## 2025-05-07 VITALS
RESPIRATION RATE: 16 BRPM | TEMPERATURE: 98 F | WEIGHT: 212 LBS | DIASTOLIC BLOOD PRESSURE: 79 MMHG | BODY MASS INDEX: 31.4 KG/M2 | OXYGEN SATURATION: 94 % | HEIGHT: 69 IN | SYSTOLIC BLOOD PRESSURE: 126 MMHG | HEART RATE: 77 BPM

## 2025-05-07 DIAGNOSIS — Z85.47 HISTORY OF TESTICULAR CANCER: ICD-10-CM

## 2025-05-07 DIAGNOSIS — D50.0 IRON DEFICIENCY ANEMIA DUE TO CHRONIC BLOOD LOSS: ICD-10-CM

## 2025-05-07 DIAGNOSIS — Z00.00 WELLNESS EXAMINATION: Primary | ICD-10-CM

## 2025-05-07 DIAGNOSIS — I10 PRIMARY HYPERTENSION: ICD-10-CM

## 2025-05-07 DIAGNOSIS — E78.2 MIXED HYPERLIPIDEMIA: ICD-10-CM

## 2025-05-07 DIAGNOSIS — M25.551 RIGHT HIP PAIN: ICD-10-CM

## 2025-05-07 LAB
ALBUMIN SERPL-MCNC: 4.7 G/DL (ref 4.1–5.1)
ALP SERPL-CCNC: 63 IU/L (ref 44–121)
ALT SERPL-CCNC: 20 IU/L (ref 0–44)
APPEARANCE UR: CLEAR
AST SERPL-CCNC: 28 IU/L (ref 0–40)
BASOPHILS # BLD AUTO: 0 X10E3/UL (ref 0–0.2)
BASOPHILS NFR BLD AUTO: 0 %
BILIRUB SERPL-MCNC: 0.7 MG/DL (ref 0–1.2)
BILIRUB UR QL STRIP: NEGATIVE
BUN SERPL-MCNC: 17 MG/DL (ref 6–24)
BUN/CREAT SERPL: 14 (ref 9–20)
CALCIUM SERPL-MCNC: 9.6 MG/DL (ref 8.7–10.2)
CHLORIDE SERPL-SCNC: 104 MMOL/L (ref 96–106)
CHOLEST SERPL-MCNC: 241 MG/DL (ref 100–199)
CO2 SERPL-SCNC: 22 MMOL/L (ref 20–29)
COLOR UR: YELLOW
CREAT SERPL-MCNC: 1.2 MG/DL (ref 0.76–1.27)
EOSINOPHIL # BLD AUTO: 0 X10E3/UL (ref 0–0.4)
EOSINOPHIL NFR BLD AUTO: 0 %
ERYTHROCYTE [DISTWIDTH] IN BLOOD BY AUTOMATED COUNT: 12.8 % (ref 11.6–15.4)
EST. GFR  (NO RACE VARIABLE): 77 ML/MIN/1.73
GLOBULIN SER CALC-MCNC: 2.9 G/DL (ref 1.5–4.5)
GLUCOSE SERPL-MCNC: 92 MG/DL (ref 70–99)
GLUCOSE UR QL STRIP: NEGATIVE
HCT VFR BLD AUTO: 39.2 % (ref 37.5–51)
HDLC SERPL-MCNC: 91 MG/DL
HGB BLD-MCNC: 13.4 G/DL (ref 13–17.7)
HGB UR QL STRIP: NEGATIVE
IMM GRANULOCYTES NFR BLD AUTO: 0 %
KETONES UR QL STRIP: NEGATIVE
LDLC SERPL CALC-MCNC: 119 MG/DL (ref 0–99)
LEUKOCYTE ESTERASE UR QL STRIP: NEGATIVE
LYMPHOCYTES # BLD AUTO: 1.6 X10E3/UL (ref 0.7–3.1)
LYMPHOCYTES NFR BLD AUTO: 15 %
MCH RBC QN AUTO: 28.6 PG (ref 26.6–33)
MCHC RBC AUTO-ENTMCNC: 34.2 G/DL (ref 31.5–35.7)
MCV RBC AUTO: 84 FL (ref 79–97)
MICRO URNS: NORMAL
MONOCYTES # BLD AUTO: 0.6 X10E3/UL (ref 0.1–0.9)
MONOCYTES NFR BLD AUTO: 5 %
NEUTROPHILS # BLD AUTO: 8.4 X10E3/UL (ref 1.4–7)
NEUTROPHILS NFR BLD AUTO: 80 %
NITRITE UR QL STRIP: NEGATIVE
PH UR STRIP: 5.5 [PH] (ref 5–7.5)
PLATELET # BLD AUTO: 314 X10E3/UL (ref 150–450)
POTASSIUM SERPL-SCNC: 4 MMOL/L (ref 3.5–5.2)
PROT SERPL-MCNC: 7.6 G/DL (ref 6–8.5)
PROT UR QL STRIP: NEGATIVE
RBC # BLD AUTO: 4.69 X10E6/UL (ref 4.14–5.8)
SODIUM SERPL-SCNC: 142 MMOL/L (ref 134–144)
SP GR UR STRIP: 1.02 (ref 1–1.03)
TRIGL SERPL-MCNC: 184 MG/DL (ref 0–149)
TSH SERPL DL<=0.005 MIU/L-ACNC: 2.11 UIU/ML (ref 0.45–4.5)
UROBILINOGEN UR STRIP-MCNC: 0.2 MG/DL (ref 0.2–1)
VLDLC SERPL CALC-MCNC: 31 MG/DL (ref 5–40)
WBC # BLD AUTO: 10.6 X10E3/UL (ref 3.4–10.8)

## 2025-05-07 PROCEDURE — 3008F BODY MASS INDEX DOCD: CPT | Mod: CPTII,,, | Performed by: INTERNAL MEDICINE

## 2025-05-07 PROCEDURE — 36415 COLL VENOUS BLD VENIPUNCTURE: CPT | Mod: ,,, | Performed by: INTERNAL MEDICINE

## 2025-05-07 PROCEDURE — 3078F DIAST BP <80 MM HG: CPT | Mod: CPTII,,, | Performed by: INTERNAL MEDICINE

## 2025-05-07 PROCEDURE — 99396 PREV VISIT EST AGE 40-64: CPT | Mod: ,,, | Performed by: INTERNAL MEDICINE

## 2025-05-07 PROCEDURE — 4010F ACE/ARB THERAPY RXD/TAKEN: CPT | Mod: CPTII,,, | Performed by: INTERNAL MEDICINE

## 2025-05-07 PROCEDURE — 1159F MED LIST DOCD IN RCRD: CPT | Mod: CPTII,,, | Performed by: INTERNAL MEDICINE

## 2025-05-07 PROCEDURE — 1160F RVW MEDS BY RX/DR IN RCRD: CPT | Mod: CPTII,,, | Performed by: INTERNAL MEDICINE

## 2025-05-07 PROCEDURE — 3074F SYST BP LT 130 MM HG: CPT | Mod: CPTII,,, | Performed by: INTERNAL MEDICINE

## 2025-05-07 NOTE — PATIENT INSTRUCTIONS
Mickey Zamudio,     If you are due for any health screening(s) below please notify me so we can arrange them to be ordered and scheduled. Most healthy patients at your age complete them, but you are free to accept or refuse.     If you can't do it, I'll definitely understand. If you can, I'd certainly appreciate it!    All of your core healthy metrics are met.

## 2025-05-07 NOTE — PROGRESS NOTES
Renee Baez MD   [id number]  [place] [address]     Patient ID: [id number]     Chief Complaint: Wellness exam (Complain of right hip.)        HPI:     Robb Carballo is a 40 y.o. male here today for an annual Wellness exam and follow up of his HTN, Hyperlipidemia and history of testicular cancer. He hasn't had any ER or doctor visits. He is eating less rice and bread. He does note right hip pain.  No other complaints today.       Subjective:     Review of Systems   Constitutional: Negative.    HENT: Negative.     Eyes:         Tested vision and he's fine but looking at phone things go blurry    Respiratory: Negative.     Cardiovascular: Negative.    Gastrointestinal:         Bloating at times, usually in morning, can last a long time or be gone in a few hours. He thinks related to food, first thought ice cream and cut back and it helped but also concerned it might be steak. He did steak and baked potato last night and had it for several hours then this morning. The potato did have cheese and butter on it and he did have an ice cream sandwich.   Genitourinary:         Not getting up at night but when he gets up he has to go immediately. Enid said come if problems, it's been long enough he doesn't need surveillance.    Musculoskeletal:         He had strained his back a few days back lifting his grandmother out of the recliner and it slipped back with her full weight landing unbalanced on him. It hurt for a few days and is back to normal now. He can get her to get up and walk and she's happy. His hip went out in 2019, it was worse, they said it was bursitis. He really couldn't walk then. Our xray was negative. Now it's like the hip is stiff but deep. He moves a lot at work but getting up from a chair or using the fork lift at work can sometimes really set it off and he'll be limping. Sometimes there's a catch. His bad knee(right) has been doing pretty well, sometimes a little twinge but now noting some pulling  on the left knee. He thinks from favoring the hip. He does some flexion but he won't push it past when he feels tight since he's afraid to hurt It more.    Skin: Negative.    Neurological: Negative.    Endo/Heme/Allergies: Negative.    Psychiatric/Behavioral: Negative.          Stressed at work, he is ready for a vacation but wants to take his wife somewhere without the girls for her birthday. She stresses him too.        Past Medical History:   Diagnosis Date    Alkaline phosphatase elevation     Colitis     COVID-19     Helicobacter pylori gastrointestinal tract infection 06/04/2021    HTN (hypertension)     Hx of testicular cancer 2007    Mixed hyperlipidemia     Obesity, unspecified     Personal history of colonic polyps 01/13/2023    Small kidney         Past Surgical History:   Procedure Laterality Date    ANTERIOR CRUCIATE LIGAMENT REPAIR N/A     COLONOSCOPY W/ BIOPSIES  01/13/2023    Dr Alvarez    COLONOSCOPY W/ BIOPSIES AND POLYPECTOMY      ESOPHAGOGASTRODUODENOSCOPY N/A 06/04/2021    with biopsy    MEDIPORT INSERTION, SINGLE  09/02/2007    ORCHIECTOMY Left 08/14/2007    Dr Wolfe    RETROPERITONEAL MASS EXCISION         Family History   Problem Relation Name Age of Onset    Hypertension Mother      Hypertension Father      Stroke Father      Stroke Maternal Grandmother      Diabetes Maternal Grandmother      Heart failure Maternal Grandmother      Arthritis Maternal Grandmother      Dementia Maternal Grandmother      Diabetes Maternal Grandfather      Heart failure Maternal Grandfather      Stomach cancer Maternal Grandfather      Coronary artery disease Maternal Grandfather      Heart failure Paternal Grandmother      Pancreatic cancer Paternal Grandmother      Diabetes type II Paternal Aunt Renee     Hypertension Paternal Aunt Renee     Coronary artery disease Paternal Aunt Renee         Social History[1]  Stressed at work and more concerned about costs of procedures this visit.     Review of  "patient's allergies indicates:  No Known Allergies    Outpatient Medications Marked as Taking for the 5/7/25 encounter (Office Visit) with Renee Baez MD   Medication Sig Dispense Refill    amLODIPine (NORVASC) 10 MG tablet Take 1 tablet (10 mg total) by mouth once daily. 90 tablet 3    losartan (COZAAR) 100 MG tablet Take 1 tablet (100 mg total) by mouth once daily. 90 tablet 3    rosuvastatin (CRESTOR) 5 MG tablet Take 1 tablet by mouth once daily 90 tablet 0       Patient Care Team:  Renee Baez MD as PCP - General (Internal Medicine)  Hardy Wolfe MD as Consulting Physician (Urology)  Gonzalo Álvarez MD as Consulting Physician (Orthopedic Surgery)  Yakov Alvarez MD as Consulting Physician (Gastroenterology)       Objective:     /79   Pulse 77   Temp 98.1 °F (36.7 °C) (Oral)   Resp 16   Ht 5' 9" (1.753 m)   Wt 96.2 kg (212 lb)   SpO2 (!) 94%   BMI 31.31 kg/m²     Physical Exam  Vitals reviewed.   Constitutional:       Appearance: Normal appearance.   HENT:      Head: Normocephalic and atraumatic.      Right Ear: Tympanic membrane, ear canal and external ear normal.      Left Ear: Tympanic membrane, ear canal and external ear normal.      Mouth/Throat:      Mouth: Mucous membranes are moist.      Pharynx: No oropharyngeal exudate or posterior oropharyngeal erythema.   Cardiovascular:      Rate and Rhythm: Normal rate and regular rhythm.   Pulmonary:      Effort: Pulmonary effort is normal.      Breath sounds: Normal breath sounds.   Abdominal:      General: Abdomen is flat.      Palpations: Abdomen is soft.   Musculoskeletal:         General: No tenderness or deformity.      Cervical back: Neck supple.   Skin:     General: Skin is warm and dry.   Neurological:      General: No focal deficit present.      Mental Status: He is alert and oriented to person, place, and time. Mental status is at baseline.   Psychiatric:         Mood and Affect: Mood normal.         Behavior: Behavior " normal.         Thought Content: Thought content normal.         Judgment: Judgment normal.               Assessment/Plan:     1. Wellness examination  Comments:  Up to date, will plan PSA by 45  Orders:  -     CBC Auto Differential  -     Comprehensive Metabolic Panel  -     Lipid Panel  -     TSH  -     Urinalysis    2. Primary hypertension  Comments:  Good control, at goal, does not need refill on amlodipine or losartan.  Orders:  -     Comprehensive Metabolic Panel  -     Lipid Panel  -     Urinalysis    3. Mixed hyperlipidemia  Comments:  Reports compliance with rosuvastatin  Orders:  -     Lipid Panel    4. History of testicular cancer  Comments:  No longer following up with urology  Orders:  -     CBC Auto Differential    5. Iron deficiency anemia due to chronic blood loss  Comments:  Last iron was good but will check CBC and add if low.  Orders:  -     CBC Auto Differential    6. Right hip pain  Comments:  I'd start with stretches for the hip, if it doesn't improve I will set up xray. He does not want Ortho as they want MRI             Follow up in about 6 months (around 11/7/2025) for HTN. In addition to their scheduled follow up, the patient has also been instructed to follow up on as needed basis.     Signature:  Renee Baez MD  [facility]  [id number] [place] [address]         [1]   Social History  Socioeconomic History    Marital status: Unknown   Tobacco Use    Smoking status: Never    Smokeless tobacco: Never   Substance and Sexual Activity    Alcohol use: Not Currently    Drug use: Never     Social Drivers of Health     Financial Resource Strain: Low Risk  (5/7/2025)    Overall Financial Resource Strain (CARDIA)     Difficulty of Paying Living Expenses: Not hard at all   Food Insecurity: No Food Insecurity (5/7/2025)    Hunger Vital Sign     Worried About Running Out of Food in the Last Year: Never true     Ran Out of Food in the Last Year: Never true   Transportation Needs: No  Transportation Needs (5/7/2025)    PRAPARE - Transportation     Lack of Transportation (Medical): No     Lack of Transportation (Non-Medical): No   Physical Activity: Inactive (5/7/2025)    Exercise Vital Sign     Days of Exercise per Week: 0 days     Minutes of Exercise per Session: 0 min   Stress: No Stress Concern Present (5/7/2025)    [employer] - Occupational Stress Questionnaire     Feeling of Stress : Not at all   Housing Stability: Low Risk  (5/7/2025)    Housing Stability Vital Sign     Unable to Pay for Housing in the Last Year: No     Number of Times Moved in the Last Year: 0     Homeless in the Last Year: No

## 2025-05-08 ENCOUNTER — TELEPHONE (OUTPATIENT)
Dept: PRIMARY CARE CLINIC | Facility: CLINIC | Age: 40
End: 2025-05-08
Payer: COMMERCIAL

## 2025-05-08 ENCOUNTER — RESULTS FOLLOW-UP (OUTPATIENT)
Dept: PRIMARY CARE CLINIC | Facility: CLINIC | Age: 40
End: 2025-05-08

## 2025-05-08 DIAGNOSIS — Z79.899 ON STATIN THERAPY: ICD-10-CM

## 2025-05-08 DIAGNOSIS — E78.00 HYPERCHOLESTEREMIA: Primary | ICD-10-CM

## 2025-05-08 RX ORDER — ROSUVASTATIN CALCIUM 10 MG/1
10 TABLET, COATED ORAL DAILY
Qty: 90 TABLET | Refills: 3 | Status: SHIPPED | OUTPATIENT
Start: 2025-05-08 | End: 2026-05-08

## 2025-05-08 NOTE — TELEPHONE ENCOUNTER
----- Message from Renee Baez MD sent at 5/8/2025  8:21 AM CDT -----  Everything was good except the cholesterol, it went up. If he's behaving on his diet I need to increase his rosuvastatin.   ----- Message -----  From: Marimar Fletcher  Sent: 5/7/2025   6:09 PM CDT  To: Renee Baez MD

## 2025-05-08 NOTE — TELEPHONE ENCOUNTER
It will be 10 mg so if he has a lot of the 5's it would be two. In an ideal world I'd check his liver and muscle enzyme in a month but knowing him I'll do it on his return. If he gets aches or feels funny/nauseated he needs to come for it.

## 2025-06-05 DIAGNOSIS — E78.00 HYPERCHOLESTEREMIA: ICD-10-CM

## 2025-06-05 RX ORDER — AMLODIPINE BESYLATE 10 MG/1
10 TABLET ORAL
Qty: 90 TABLET | Refills: 3 | Status: SHIPPED | OUTPATIENT
Start: 2025-06-05

## 2025-06-05 RX ORDER — LOSARTAN POTASSIUM 100 MG/1
100 TABLET ORAL DAILY
Qty: 90 TABLET | Refills: 3 | Status: SHIPPED | OUTPATIENT
Start: 2025-06-05

## 2025-06-05 RX ORDER — ROSUVASTATIN CALCIUM 5 MG/1
5 TABLET, COATED ORAL
Qty: 90 TABLET | Refills: 0 | OUTPATIENT
Start: 2025-06-05

## 2025-06-05 RX ORDER — ROSUVASTATIN CALCIUM 10 MG/1
10 TABLET, COATED ORAL DAILY
Qty: 90 TABLET | Refills: 3 | Status: SHIPPED | OUTPATIENT
Start: 2025-06-05

## 2025-06-09 ENCOUNTER — TELEPHONE (OUTPATIENT)
Dept: PRIMARY CARE CLINIC | Facility: CLINIC | Age: 40
End: 2025-06-09
Payer: COMMERCIAL

## 2025-06-09 NOTE — TELEPHONE ENCOUNTER
Copied from CRM #[id number]. Topic: Appointments - Information Request  >> Jun 9, 2025  9:56 AM Courtney wrote:  Who Called: Robb Carballo    Caller is requesting assistance/information from provider's office.    Symptoms (please be specific):   How long has patient had these symptoms:   List of preferred pharmacies on file (remove unneeded): [unfilled]  If different, enter pharmacy into here including location and phone number:       Preferred Method of Contact: Phone Call  Patient's Preferred Phone Number on File: [phone]   Best Call Back Number, if different:  Additional Information: pt is requesting  callback due to questions he has . Please Advise.

## 2025-06-23 DIAGNOSIS — E78.00 HYPERCHOLESTEREMIA: ICD-10-CM

## 2025-06-23 RX ORDER — ROSUVASTATIN CALCIUM 10 MG/1
10 TABLET, COATED ORAL DAILY
Qty: 90 TABLET | Refills: 3 | Status: SHIPPED | OUTPATIENT
Start: 2025-06-23

## 2025-06-23 RX ORDER — ROSUVASTATIN CALCIUM 5 MG/1
5 TABLET, COATED ORAL
Qty: 90 TABLET | Refills: 0 | OUTPATIENT
Start: 2025-06-23

## 2025-07-02 ENCOUNTER — RESULTS FOLLOW-UP (OUTPATIENT)
Dept: PRIMARY CARE CLINIC | Facility: CLINIC | Age: 40
End: 2025-07-02

## 2025-07-02 ENCOUNTER — TELEPHONE (OUTPATIENT)
Dept: PRIMARY CARE CLINIC | Facility: CLINIC | Age: 40
End: 2025-07-02
Payer: COMMERCIAL

## 2025-07-02 NOTE — TELEPHONE ENCOUNTER
----- Message from Renee Baez MD sent at 7/2/2025  1:17 PM CDT -----  Good, tolerating the dose of cholesterol medicine. Let me know when needs refills.   ----- Message -----  From: Marimar Fletcher  Sent: 7/2/2025  10:12 AM CDT  To: Renee Baez MD

## 2025-08-18 ENCOUNTER — PATIENT MESSAGE (OUTPATIENT)
Dept: PRIMARY CARE CLINIC | Facility: CLINIC | Age: 40
End: 2025-08-18
Payer: COMMERCIAL